# Patient Record
Sex: MALE | Race: WHITE
[De-identification: names, ages, dates, MRNs, and addresses within clinical notes are randomized per-mention and may not be internally consistent; named-entity substitution may affect disease eponyms.]

---

## 2019-10-25 ENCOUNTER — HOSPITAL ENCOUNTER (INPATIENT)
Dept: HOSPITAL 92 - ERS | Age: 69
LOS: 11 days | Discharge: ANOTHER HOSPITAL | DRG: 871 | End: 2019-11-05
Attending: INTERNAL MEDICINE | Admitting: INTERNAL MEDICINE
Payer: COMMERCIAL

## 2019-10-25 VITALS — BODY MASS INDEX: 17.9 KG/M2

## 2019-10-25 DIAGNOSIS — Z79.899: ICD-10-CM

## 2019-10-25 DIAGNOSIS — N18.9: ICD-10-CM

## 2019-10-25 DIAGNOSIS — R07.89: ICD-10-CM

## 2019-10-25 DIAGNOSIS — R65.20: ICD-10-CM

## 2019-10-25 DIAGNOSIS — A31.0: ICD-10-CM

## 2019-10-25 DIAGNOSIS — N17.9: ICD-10-CM

## 2019-10-25 DIAGNOSIS — R33.9: ICD-10-CM

## 2019-10-25 DIAGNOSIS — I95.9: ICD-10-CM

## 2019-10-25 DIAGNOSIS — K21.9: ICD-10-CM

## 2019-10-25 DIAGNOSIS — K94.29: ICD-10-CM

## 2019-10-25 DIAGNOSIS — J18.9: ICD-10-CM

## 2019-10-25 DIAGNOSIS — D64.9: ICD-10-CM

## 2019-10-25 DIAGNOSIS — E87.1: ICD-10-CM

## 2019-10-25 DIAGNOSIS — I12.9: ICD-10-CM

## 2019-10-25 DIAGNOSIS — H91.90: ICD-10-CM

## 2019-10-25 DIAGNOSIS — A41.02: Primary | ICD-10-CM

## 2019-10-25 DIAGNOSIS — J96.01: ICD-10-CM

## 2019-10-25 DIAGNOSIS — B19.20: ICD-10-CM

## 2019-10-25 DIAGNOSIS — K59.00: ICD-10-CM

## 2019-10-25 DIAGNOSIS — J44.1: ICD-10-CM

## 2019-10-25 DIAGNOSIS — N39.0: ICD-10-CM

## 2019-10-25 DIAGNOSIS — C14.0: ICD-10-CM

## 2019-10-25 DIAGNOSIS — E87.5: ICD-10-CM

## 2019-10-25 DIAGNOSIS — A04.72: ICD-10-CM

## 2019-10-25 DIAGNOSIS — Z88.0: ICD-10-CM

## 2019-10-25 DIAGNOSIS — E44.0: ICD-10-CM

## 2019-10-25 DIAGNOSIS — E87.0: ICD-10-CM

## 2019-10-25 DIAGNOSIS — C32.9: ICD-10-CM

## 2019-10-25 DIAGNOSIS — J98.11: ICD-10-CM

## 2019-10-25 LAB
ALBUMIN SERPL BCG-MCNC: 3.7 G/DL (ref 3.4–4.8)
ALP SERPL-CCNC: 89 U/L (ref 40–110)
ALT SERPL W P-5'-P-CCNC: 22 U/L (ref 8–55)
ANALYZER IN CARDIO: (no result)
ANION GAP SERPL CALC-SCNC: 13 MMOL/L (ref 10–20)
AST SERPL-CCNC: 25 U/L (ref 5–34)
BACTERIA UR QL AUTO: (no result) HPF
BASE EXCESS STD BLDA CALC-SCNC: 4.4 MEQ/L
BASOPHILS # BLD AUTO: 0 THOU/UL (ref 0–0.2)
BASOPHILS NFR BLD AUTO: 0 % (ref 0–1)
BILIRUB SERPL-MCNC: 0.3 MG/DL (ref 0.2–1.2)
BUN SERPL-MCNC: 50 MG/DL (ref 8.4–25.7)
CA-I BLDA-SCNC: 1.18 MMOL/L (ref 1.12–1.3)
CALCIUM SERPL-MCNC: 9.6 MG/DL (ref 7.8–10.44)
CHLORIDE SERPL-SCNC: 93 MMOL/L (ref 98–107)
CK MB SERPL-MCNC: 0.8 NG/ML (ref 0–6.6)
CK SERPL-CCNC: 26 U/L (ref 30–200)
CO2 SERPL-SCNC: 32 MMOL/L (ref 23–31)
CREAT CL PREDICTED SERPL C-G-VRATE: 0 ML/MIN (ref 70–130)
EOSINOPHIL # BLD AUTO: 0 THOU/UL (ref 0–0.7)
EOSINOPHIL NFR BLD AUTO: 0.1 % (ref 0–10)
GLOBULIN SER CALC-MCNC: 4 G/DL (ref 2.4–3.5)
GLUCOSE SERPL-MCNC: 107 MG/DL (ref 80–115)
HCO3 BLDA-SCNC: 28.7 MEQ/L (ref 22–28)
HCT VFR BLDA CALC: 31 % (ref 42–52)
HGB BLD-MCNC: 9.7 G/DL (ref 14–18)
HGB BLDA-MCNC: 10.5 G/DL (ref 14–18)
LEUKOCYTE ESTERASE UR QL STRIP.AUTO: 500 LEU/UL
LYMPHOCYTES # BLD: 0.4 THOU/UL (ref 1.2–3.4)
LYMPHOCYTES NFR BLD AUTO: 3.2 % (ref 21–51)
MCH RBC QN AUTO: 31.3 PG (ref 27–31)
MCV RBC AUTO: 95.1 FL (ref 78–98)
MONOCYTES # BLD AUTO: 1.4 THOU/UL (ref 0.11–0.59)
MONOCYTES NFR BLD AUTO: 9.9 % (ref 0–10)
NEUTROPHILS # BLD AUTO: 11.9 THOU/UL (ref 1.4–6.5)
NEUTROPHILS NFR BLD AUTO: 86.8 % (ref 42–75)
O2 A-A PPRESDIFF RESPIRATORY: 141.75 MM[HG] (ref 0–20)
PCO2 BLDA: 41.7 MMHG (ref 35–45)
PH BLDA: 7.46 [PH] (ref 7.35–7.45)
PLATELET # BLD AUTO: 254 THOU/UL (ref 130–400)
PO2 BLDA: 62.8 MMHG (ref 80–?)
POTASSIUM BLD-SCNC: 3.58 MMOL/L (ref 3.7–5.3)
POTASSIUM SERPL-SCNC: 3.6 MMOL/L (ref 3.5–5.1)
PROT UR STRIP.AUTO-MCNC: 30 MG/DL
RBC # BLD AUTO: 3.09 MILL/UL (ref 4.7–6.1)
RBC UR QL AUTO: (no result) HPF (ref 0–3)
SODIUM SERPL-SCNC: 134 MMOL/L (ref 136–145)
SPECIMEN DRAWN FROM PATIENT: (no result)
WBC # BLD AUTO: 13.8 THOU/UL (ref 4.8–10.8)

## 2019-10-25 PROCEDURE — 83735 ASSAY OF MAGNESIUM: CPT

## 2019-10-25 PROCEDURE — 36416 COLLJ CAPILLARY BLOOD SPEC: CPT

## 2019-10-25 PROCEDURE — 93005 ELECTROCARDIOGRAM TRACING: CPT

## 2019-10-25 PROCEDURE — 96367 TX/PROPH/DG ADDL SEQ IV INF: CPT

## 2019-10-25 PROCEDURE — 82550 ASSAY OF CK (CPK): CPT

## 2019-10-25 PROCEDURE — 90670 PCV13 VACCINE IM: CPT

## 2019-10-25 PROCEDURE — 84439 ASSAY OF FREE THYROXINE: CPT

## 2019-10-25 PROCEDURE — 36415 COLL VENOUS BLD VENIPUNCTURE: CPT

## 2019-10-25 PROCEDURE — 82805 BLOOD GASES W/O2 SATURATION: CPT

## 2019-10-25 PROCEDURE — 85025 COMPLETE CBC W/AUTO DIFF WBC: CPT

## 2019-10-25 PROCEDURE — 87086 URINE CULTURE/COLONY COUNT: CPT

## 2019-10-25 PROCEDURE — 87116 MYCOBACTERIA CULTURE: CPT

## 2019-10-25 PROCEDURE — 82553 CREATINE MB FRACTION: CPT

## 2019-10-25 PROCEDURE — 83605 ASSAY OF LACTIC ACID: CPT

## 2019-10-25 PROCEDURE — 82274 ASSAY TEST FOR BLOOD FECAL: CPT

## 2019-10-25 PROCEDURE — 80048 BASIC METABOLIC PNL TOTAL CA: CPT

## 2019-10-25 PROCEDURE — 81003 URINALYSIS AUTO W/O SCOPE: CPT

## 2019-10-25 PROCEDURE — 87077 CULTURE AEROBIC IDENTIFY: CPT

## 2019-10-25 PROCEDURE — 36569 INSJ PICC 5 YR+ W/O IMAGING: CPT

## 2019-10-25 PROCEDURE — 85027 COMPLETE CBC AUTOMATED: CPT

## 2019-10-25 PROCEDURE — 74018 RADEX ABDOMEN 1 VIEW: CPT

## 2019-10-25 PROCEDURE — C1751 CATH, INF, PER/CENT/MIDLINE: HCPCS

## 2019-10-25 PROCEDURE — 80053 COMPREHEN METABOLIC PANEL: CPT

## 2019-10-25 PROCEDURE — 90471 IMMUNIZATION ADMIN: CPT

## 2019-10-25 PROCEDURE — 82533 TOTAL CORTISOL: CPT

## 2019-10-25 PROCEDURE — 71275 CT ANGIOGRAPHY CHEST: CPT

## 2019-10-25 PROCEDURE — 96366 THER/PROPH/DIAG IV INF ADDON: CPT

## 2019-10-25 PROCEDURE — 83880 ASSAY OF NATRIURETIC PEPTIDE: CPT

## 2019-10-25 PROCEDURE — S0028 INJECTION, FAMOTIDINE, 20 MG: HCPCS

## 2019-10-25 PROCEDURE — G0009 ADMIN PNEUMOCOCCAL VACCINE: HCPCS

## 2019-10-25 PROCEDURE — 87149 DNA/RNA DIRECT PROBE: CPT

## 2019-10-25 PROCEDURE — 71045 X-RAY EXAM CHEST 1 VIEW: CPT

## 2019-10-25 PROCEDURE — 93306 TTE W/DOPPLER COMPLETE: CPT

## 2019-10-25 PROCEDURE — 93010 ELECTROCARDIOGRAM REPORT: CPT

## 2019-10-25 PROCEDURE — 84443 ASSAY THYROID STIM HORMONE: CPT

## 2019-10-25 PROCEDURE — P9047 ALBUMIN (HUMAN), 25%, 50ML: HCPCS

## 2019-10-25 PROCEDURE — 84484 ASSAY OF TROPONIN QUANT: CPT

## 2019-10-25 PROCEDURE — 81015 MICROSCOPIC EXAM OF URINE: CPT

## 2019-10-25 PROCEDURE — 94640 AIRWAY INHALATION TREATMENT: CPT

## 2019-10-25 PROCEDURE — 96365 THER/PROPH/DIAG IV INF INIT: CPT

## 2019-10-25 PROCEDURE — 87206 SMEAR FLUORESCENT/ACID STAI: CPT

## 2019-10-25 PROCEDURE — 80202 ASSAY OF VANCOMYCIN: CPT

## 2019-10-25 PROCEDURE — 96368 THER/DIAG CONCURRENT INF: CPT

## 2019-10-25 PROCEDURE — 87186 SC STD MICRODIL/AGAR DIL: CPT

## 2019-10-25 PROCEDURE — 87040 BLOOD CULTURE FOR BACTERIA: CPT

## 2019-10-25 NOTE — RAD
Exam: Chest one view



HISTORY:Hypotension. Low O2 saturation.



Comparison: None



FINDINGS:

Cardiac silhouette: Normal

Aorta: Unremarkable

Pulmonary vessels: Normal

Costophrenic angles: Clear



LUNGS: Increased linear opacities in the left and right lung base may represent bibasilar atelectasis
. The possibility of aspiration or pneumonia in the right lung base cannot be excluded.



Pneumothorax: None



Osseous abnormalities: None



IMPRESSION: Bibasilar opacities, right greater than left. Continued surveillance is recommended.



Reported By: Grace Acosta 

Electronically Signed:  10/25/2019 8:06 PM

## 2019-10-25 NOTE — CT
Exam: CT angiogram of the chest



HISTORY: Dyspnea.



COMPARISON: None





TECHNIQUE: CT angiogram of the chest is performed in the axial plane. Three-dimensional reformatted i
mages are submitted for interpretation



FINDINGS:

Mediastinum: Conglomeration of enlarged AP window lymph nodes measuring 2.6 x 1.8 cm. Enlarged right 
hilar lymph node measuring 1.7 x 1.5 cm. Enlarged subcarinal lymph node measuring 2.9 x 1.1 cm.



HEART: Normal size. No significant pericardial fluid. 

Aorta: No aneurysm or dissection 

Upper solid abdominal viscera: No abnormality enhancement. 

Trachea and central bronchi: Patent. There is opacification of multiple right lower lobe bronchioles 
and to lesser extent left lower lobe bronchioles which may be due to mucous plugging or secretions.

There is evidence of consolidation in both lower lobes likely due to infiltrate and/or atelectasis fr
om more central airway opacification.

Pleural spaces: No effusion



Lung parenchyma: There is septal thickening as well as some irregular tree-in-bud opacities. Addition
al scattered linear groundglass and nodular groundglass opacities are noted. 0.6 x 0.8 cm solid

nodule in the left lower lobe.

Pneumothorax: None

Osseous structures: No lytic or blastic lesions



Pulmonary arteries: Adequate contrast opacification pulmonary arterial system to the level of segment
al arteries. No filling defect to suggest pulmonary embolism



IMPRESSION:

1. No evidence of pulmonary artery aneurysm to segmental arteries

2. Mediastinal and right hilar lymphadenopathy.

3. Tree-in-bud opacities along with patchy linear and nodular groundglass opacities. Correlate for at
ypical infection.

4. Opacification of bilateral lower lobe bronchioles. Correlate for mucous plug versus secretions. As
sociated postobstructive atelectasis and/or bilateral lower lobe pneumonia. 

5. Solid nodule in the left lower lobe.



Code lung nodule



Transcribed Date/Time: 10/25/2019 9:29 PM



Reported By: Grace Acosta 

Electronically Signed:  10/25/2019 10:12 PM

## 2019-10-26 LAB
ANALYZER IN CARDIO: (no result)
ANION GAP SERPL CALC-SCNC: 14 MMOL/L (ref 10–20)
BASE EXCESS STD BLDA CALC-SCNC: 4.3 MEQ/L
BASOPHILS # BLD AUTO: 0 THOU/UL (ref 0–0.2)
BASOPHILS # BLD AUTO: 0.1 THOU/UL (ref 0–0.2)
BASOPHILS NFR BLD AUTO: 0 % (ref 0–1)
BASOPHILS NFR BLD AUTO: 0.4 % (ref 0–1)
BUN SERPL-MCNC: 41 MG/DL (ref 8.4–25.7)
CA-I BLDA-SCNC: 1.14 MMOL/L (ref 1.12–1.3)
CALCIUM SERPL-MCNC: 8.8 MG/DL (ref 7.8–10.44)
CHLORIDE SERPL-SCNC: 99 MMOL/L (ref 98–107)
CO2 SERPL-SCNC: 27 MMOL/L (ref 23–31)
CREAT CL PREDICTED SERPL C-G-VRATE: 42 ML/MIN (ref 70–130)
EOSINOPHIL # BLD AUTO: 0 THOU/UL (ref 0–0.7)
EOSINOPHIL # BLD AUTO: 0 THOU/UL (ref 0–0.7)
EOSINOPHIL NFR BLD AUTO: 0 % (ref 0–10)
EOSINOPHIL NFR BLD AUTO: 0.1 % (ref 0–10)
GLUCOSE SERPL-MCNC: 114 MG/DL (ref 80–115)
HCO3 BLDA-SCNC: 30.7 MEQ/L (ref 22–28)
HCT VFR BLDA CALC: 28 % (ref 42–52)
HGB BLD-MCNC: 9 G/DL (ref 14–18)
HGB BLD-MCNC: 9.5 G/DL (ref 14–18)
HGB BLDA-MCNC: 9.5 G/DL (ref 14–18)
LYMPHOCYTES # BLD: 0.2 THOU/UL (ref 1.2–3.4)
LYMPHOCYTES # BLD: 0.5 THOU/UL (ref 1.2–3.4)
LYMPHOCYTES NFR BLD AUTO: 1.1 % (ref 21–51)
LYMPHOCYTES NFR BLD AUTO: 4.1 % (ref 21–51)
MCH RBC QN AUTO: 31.7 PG (ref 27–31)
MCH RBC QN AUTO: 31.9 PG (ref 27–31)
MCV RBC AUTO: 95.8 FL (ref 78–98)
MCV RBC AUTO: 95.9 FL (ref 78–98)
MONOCYTES # BLD AUTO: 1.2 THOU/UL (ref 0.11–0.59)
MONOCYTES # BLD AUTO: 1.2 THOU/UL (ref 0.11–0.59)
MONOCYTES NFR BLD AUTO: 10.1 % (ref 0–10)
MONOCYTES NFR BLD AUTO: 8.3 % (ref 0–10)
NEUTROPHILS # BLD AUTO: 10.5 THOU/UL (ref 1.4–6.5)
NEUTROPHILS # BLD AUTO: 12.9 THOU/UL (ref 1.4–6.5)
NEUTROPHILS NFR BLD AUTO: 85.7 % (ref 42–75)
NEUTROPHILS NFR BLD AUTO: 90.2 % (ref 42–75)
O2 A-A PPRESDIFF RESPIRATORY: 219.57 MM[HG] (ref 0–20)
PCO2 BLDA: 55.7 MMHG (ref 35–45)
PH BLDA: 7.36 [PH] (ref 7.35–7.45)
PLATELET # BLD AUTO: 210 THOU/UL (ref 130–400)
PLATELET # BLD AUTO: 228 THOU/UL (ref 130–400)
PO2 BLDA: 67.3 MMHG (ref 80–?)
POTASSIUM BLD-SCNC: 3.52 MMOL/L (ref 3.7–5.3)
POTASSIUM SERPL-SCNC: 3.7 MMOL/L (ref 3.5–5.1)
RBC # BLD AUTO: 2.85 MILL/UL (ref 4.7–6.1)
RBC # BLD AUTO: 2.98 MILL/UL (ref 4.7–6.1)
SODIUM SERPL-SCNC: 136 MMOL/L (ref 136–145)
SPECIMEN DRAWN FROM PATIENT: (no result)
TROPONIN I SERPL DL<=0.01 NG/ML-MCNC: 0.02 NG/ML (ref ?–0.03)
TROPONIN I SERPL DL<=0.01 NG/ML-MCNC: 0.04 NG/ML (ref ?–0.03)
WBC # BLD AUTO: 12.3 THOU/UL (ref 4.8–10.8)
WBC # BLD AUTO: 14.3 THOU/UL (ref 4.8–10.8)

## 2019-10-26 RX ADMIN — HEPARIN SODIUM SCH UNITS: 5000 INJECTION, SOLUTION INTRAVENOUS; SUBCUTANEOUS at 08:39

## 2019-10-26 RX ADMIN — ACETAMINOPHEN AND CODEINE PHOSPHATE PRN TAB: 300; 30 TABLET ORAL at 21:03

## 2019-10-26 RX ADMIN — ACETAMINOPHEN AND CODEINE PHOSPHATE PRN TAB: 300; 30 TABLET ORAL at 14:59

## 2019-10-26 RX ADMIN — VANCOMYCIN HYDROCHLORIDE SCH MG: KIT at 12:38

## 2019-10-26 RX ADMIN — Medication SCH EACH: at 21:18

## 2019-10-26 RX ADMIN — AZITHROMYCIN SCH MLS: 500 INJECTION, POWDER, LYOPHILIZED, FOR SOLUTION INTRAVENOUS at 02:01

## 2019-10-26 RX ADMIN — AZITHROMYCIN SCH MLS: 500 INJECTION, POWDER, LYOPHILIZED, FOR SOLUTION INTRAVENOUS at 23:59

## 2019-10-26 RX ADMIN — ACETAMINOPHEN AND CODEINE PHOSPHATE PRN TAB: 300; 30 TABLET ORAL at 10:47

## 2019-10-26 RX ADMIN — VANCOMYCIN HYDROCHLORIDE SCH MG: KIT at 18:14

## 2019-10-26 RX ADMIN — HEPARIN SODIUM SCH UNITS: 5000 INJECTION, SOLUTION INTRAVENOUS; SUBCUTANEOUS at 14:48

## 2019-10-26 RX ADMIN — ACETAMINOPHEN AND CODEINE PHOSPHATE PRN TAB: 300; 30 TABLET ORAL at 05:47

## 2019-10-26 RX ADMIN — HEPARIN SODIUM SCH UNITS: 5000 INJECTION, SOLUTION INTRAVENOUS; SUBCUTANEOUS at 21:05

## 2019-10-26 NOTE — RAD
PORTABLE CHEST 1 VIEW:

 

Date:  10/26/19 

Time:  1045 hours

 

HISTORY:  

Crackles, rhonchi. 

 

FINDINGS:

 

Comparison made with exam of previous day. 

 

The heart size is normal. The aorta is tortuous. The lungs are well expanded with bibasilar infiltrat
es, right greater than left. No pneumothoraces or pleural effusions are seen. 

 

IMPRESSION: 

Stable exam. 

 

 

POS: OFF

## 2019-10-26 NOTE — HP
PRIMARY CARE PROVIDER:  Unknown.



CHIEF COMPLAINT:  Hypotension and hypoxia.



HISTORY OF PRESENT ILLNESS:  Mr. Castro is a 69-year-old gentleman, who was 
seen

in the emergency room at [facility] on October 25, 2019, 

following transfer from [facility]. 



He is unable to provide any significant history.  Collateral history was 
obtained

from discussion with emergency room physician and review of medical record. 



He is currently being treated for Clostridium difficile infection.  He was 
found to

be hypoxic and hypotensive in [facility].  Therefore, he was sent 
to the

emergency room.  His blood pressure was reportedly 84/50 at the [facility]. 



He denies any chest pain.  He denies any fevers.  He reports cough.  He reports 
that

the cough is not productive.  He denies any abdominal pain or diarrhea. 



He reports headache, but is unable to characterize it further.



REVIEW OF SYSTEMS:  All systems were reviewed and found to be negative except 
for

the pertinent positives mentioned above. 



PAST MEDICAL HISTORY:  Throat and neck cancer, gastroesophageal reflux disease,

anogenital herpes, hypertension, Clostridium difficile, hepatitis C, COPD, and

chronic kidney disease. 



PAST SURGICAL HISTORY:  Mastoidectomy.



SOCIAL HISTORY:  No history of tobacco use, alcohol use, or recreational drug 
use.



FAMILY HISTORY:  No family history of premature coronary artery disease.



ALLERGIES:  PENICILLIN.



CURRENT MEDICATIONS:  These need to be clarified, but include,

1. Acetaminophen.

2. Levothyroxine.

3. ProAir HFA.

4. DuoNebs.



PHYSICAL EXAMINATION:

GENERAL:  On examination, Mr. Castro is awake and alert, appears frail, not in

acute distress. 

VITAL SIGNS:  Blood pressure is 161/78, pulse 115, respiratory rate 20, and 
oxygen

saturation 92% on 5 L of oxygen.  T-max in the emergency room was 102.5. 

EYES:  No scleral icterus, no conjunctival pallor. 

ENT:  Dry mucosal membranes.  No oropharyngeal erythema or exudates. 

NECK:  Supple, nontender, trachea is midline. 

RESPIRATORY:  Accessory muscles of breathing are mildly active.  Chest wall

movements are symmetric bilaterally.  Lung examination reveals bibasilar 
crackles. 

CARDIOVASCULAR:  S1 and S2 are heard, tachycardic and regular.  Peripheral 
pulses

palpable.  No carotid bruit. 

ABDOMEN:  Soft, nontender, bowel sounds heard, he has a PEG tube. 

NEUROLOGIC:  Cranial nerves 2 through 12 are intact. 

MUSCULOSKELETAL:  The patient is moving all 4 extremities. 

SKIN:  No rashes. 

LYMPHATIC:  No cervical lymphadenopathy. 

PSYCHIATRIC:  Normal mood, normal affect, the patient is oriented to person and

place. 



LABORATORY DATA:  Mr. Castro's labs and investigations were reviewed.  I 
reviewed

his electrocardiogram, which shows sinus tachycardia, no ST changes to suggest 
an

acute coronary syndrome.  I also reviewed his chest x-ray, which shows bibasilar

opacity.  CT angiogram of the chest was also done.  There was no evidence of

pulmonary artery aneurysm.  He has mediastinal and right hilar lymphadenopathy,

tree-in-bud opacities along with patchy linear and nodular ground-glass 
opacities

and opacification of bilateral lower lobe bronchioles.  He has solid nodule in 
the

left lower lobe. 



He has decreased sodium of 134, elevated blood urea nitrogen of 50, elevated

creatinine of 1.74, unremarkable LFTs, decreased TSH of 0.0331, indeterminate

troponin-I of 0.045.  Leukocytosis with 13,800 white cells, of which 87% are

neutrophils; normocytic anemia with hemoglobin 9.7 and normal platelet count.

Arterial blood gases show pH 7.46, pCO2 of 41.7, and PO2 of 62.8.  Urinalysis is

positive for leukocyte esterase and hyaline casts. 



ASSESSMENT AND PLAN:  Mr. Castro is a pleasant 69-year-old gentleman, who was 
seen

at [facility] on October 25, 2019.  His problem list

includes: 

1. Bilateral pneumonia:  Mr. Castro is presenting with bilateral pneumonia.  
He is

maintaining good oxygen saturation with supplemental oxygen.  He has received

cefepime and vancomycin in the emergency room, which I will continue for now in

addition to azithromycin.  He is being admitted to [facility] because of bilateral

pneumonia. 

2. Hypotension:  The patient has responded well to fluid resuscitation with

resolution of hypotension. 

3. Chronic kidney disease:  Baseline is unclear, he appears to be chronic kidney

disease, stage 3.  We will follow creatinine and treat accordingly. 

4. Hyponatremia:  Mild, likely asymptomatic, recheck.

5. Decreased TSH:  We will check free T4.  We will adjust levothyroxine dose

accordingly. 

6. Clostridium difficile infection:  The patient is currently being treated for

Clostridium difficile.  We will resume home medications once clarified. 

7. Elevated troponin:  The patient has troponin in the indeterminate range.  
This is

most likely secondary to chronic kidney disease.  The patient denies any chest 
pain

at this time.  We will recheck troponin level. 

8. Urinary tract infection:  The patient is being started on antibiotics for

pneumonia.  We will follow urine cultures. 

9. Acute hypoxic respiratory failure:  The patient is being treated for 
pneumonia. 

Many thanks for allowing me to participate in your patient's care.  Please feel 
free

to contact me with any questions or concerns. 



LEVEL OF RISK:  High.



LEVEL OF COMPLEXITY:  High.







Job ID:  [id number]



MTDD

## 2019-10-26 NOTE — PDOC.HOSPP
- Subjective


Encounter Date: 10/26/19


Encounter Time: 10:00


Subjective: 





This morning informed patient's blood pressure in the 60's. He received 40 mg 

IV lasix overnight. Patient's urine output was 300 am, mentation reportedly 

good per nurse.  Lactate this morning noted to be elevated at 2.6.   

Maintenance IV fluids were ordered 75 cc/hour. Per nursing staff BP had dropped 

again to 60.





Patient reports having a heart ultrasound recently this year that was normal.   

His blood pressure fluctuates from 70 to 150. He states at 150 he feels 

symptomatic and starts getting headaches. Patient states that IV phenergan 

destroyed his blood vessels in his arms which have caused his fluctuating blood 

pressures and follows with cardiologist. He used to be on amlodipine but 

stopped it recently. 





He is still short of breath, has  left sided rib and chest pain and is 

requesting tylenol 3.   Says he is unable to produce phlegm to give a good 

sputum sample.   Chest X ray did not appear to show significant pulmonary edema

, therefore 500 cc bolus ordered.   BP then increased to 150-170 and stayed 

that way persistently. Saturations dropped to 84% and oxygen increased to venti-

mask.   Lasix 10 mg IV given with improvement in blood pressure to 130.  

Patient has since voided 150 








The patient states that he has had a PEG for the past 8 years due to dysphagia 

from throat cancer. He received radiation which made it hard for him to 

swallow. He has lost over 50 pounds in the past year.  He reports being 

hospitalized for pneumonia three times in the past year.  He had a bronchoscopy 

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 The  patient states that he 

received antibiotics for 8 years. He says that they re-tested his sputum to see 

if he cleared it but thinks that he still has it.  








Patient also reports having C diff and receiving vancomycin recently. No 

diarrhea currently. 








- Objective


Vital Signs & Weight: 


 Vital Signs (12 hours)











  Temp Pulse Resp BP Pulse Ox


 


 10/26/19 08:03      98


 


 10/26/19 07:18  99.2 F    


 


 10/26/19 07:05   95  24 H   93 L


 


 10/26/19 03:35  99.9 F H    


 


 10/26/19 00:40      88 L


 


 10/26/19 00:35  98.6 F  130 H   159/99 H 








 Weight











Admit Weight                   120 lb


 


Weight                         120 lb











 Most Recent Monitor Data











Heart Rate from ECG            108


 


NIBP                           134/88


 


NIBP BP-Mean                   103


 


Respiration from ECG           19


 


SpO2                           95














I&O: 


 











 10/25/19 10/26/19 10/27/19





 06:59 06:59 06:59


 


Intake Total  484 


 


Balance  484 











Result Diagrams: 


 10/26/19 08:58





 10/26/19 03:28





Hospitalist ROS





- Review of Systems


Constitutional: denies: fever, chills


Respiratory: reports: shortness of breath


Cardiovascular: reports: chest pain


Gastrointestinal: denies: nausea, vomiting





- Medication


Medications: 


Active Medications











Generic Name Dose Route Start Last Admin





  Trade Name Freq  PRN Reason Stop Dose Admin


 


Acetaminophen/Codeine Phosphate  2 tab  10/26/19 00:00  10/26/19 10:47





  Tylenol #3  PO   2 tab





  Q4H PRN   Administration





  Severe Pain (7-10)   





     





     





     


 


Albuterol/Ipratropium  3 ml  10/26/19 07:00  10/26/19 07:05





  Duoneb  NEB   3 ml





  [id number] SHAREE   Administration





     





     





     





     


 


Furosemide  10 mg  10/26/19 11:30  10/26/19 11:22





  Lasix  SLOW IVP  10/26/19 13:00  10 mg





  NOW SHAREE   Administration





     





     





     





     


 


Heparin Sodium (Porcine)  5,000 units  10/26/19 09:00  10/26/19 08:39





  Heparin  SC   5,000 units





  TID SHAREE   Administration





     





     





     





     


 


Cefepime HCl 2 gm/ Sodium  100 mls @ 200 mls/hr  10/26/19 09:00  10/26/19 08:39





  Chloride  IVPB   100 mls





  DAILY SHAREE   Administration





     





     





     





     


 


Azithromycin 500 mg/ Sodium  250 mls @ 250 mls/hr  10/25/19 23:59  10/26/19 02:

01





  Chloride  IVPB   250 mls





  2359 SHAREE   Administration





     





     





     





     


 


Sodium Chloride  1,000 mls @ 100 mls/hr  10/26/19 10:08  10/26/19 10:18





  Normal Saline 0.9%  IV   Not Given





  .Q10H SHAREE   





     





     





     





     


 


Lidocaine  1 patch  10/26/19 09:00  10/26/19 11:27





  Lidoderm 5% Patch  TD   1 patch





  DAILY SHAREE   Administration





     





     





     





     














- Exam


General Appearance: NAD, awake alert, ill appearing


General - other findings: very tachypneic, 


Eye: PERRL, anicteric sclera


ENT: normocephalic atraumatic


Neck: supple, symmetric, no JVD


Heart: no murmur, no gallops, no rubs


Heart - other findings: tachycardia 


Respiratory - other findings: diffuse rhonchi 


Gastrointestinal: soft, non-distended


Gastrointestinal - other findings: PEG tube in place 


Extremities: no cyanosis, no clubbing, no edema


Skin: normal turgor, no lesions, no rashes


Neurological: cranial nerve grossly intact, normal sensation to touch, no focal 

deficits, no new deficit





Hosp A/P





- Plan


CTA chest: septal thickenign and irregular tree in bud opacities. Scattered 

linear ground glass and nodular groundglass opacities 0.6 x 0.3 cm solid nodule 

in LLL.  Consolidation in both lower lobes due to infiltrate or atelectasis 

from more central airway opacification. Opacification of multiple RLL 

bronchioles and to lesser extent left lower lobe bronchioles which may be due 

to mucus plugging or secretions.  Enlarged right hilar lymph node 1.7 x 1.5 cm. 

Enlargeds ubcarinal lymph node 2.9 x 1.1 cm 


Chest xray 10/25/19: bibasilar opacities right greater than left





This is a 69 year old male history of pulmonary MAC infection, C diff, throat 

cancer s/p radiation with chronic PEG tube 











Severe sepsis secondary to pneumonia


- patient had temp of 99.9, tachycardic, with lactic acid 2.6. Total he has 

received 1L of fluid. CTA chest showed tree-in bud opacities consistent with 

pneuonia  


- on IV vancomycin and zosyn, WBC improving, not spiking fever, will continue 

currently 








Acute hypoxic respiratory failure secondary to pneumonia, possible heart failure


-  patient noted to have pneumonia on his CTA chest, on IV vancomycin and 

cefepime. Continue for now 


- will check ECHO


- ABG








Sinus tachycardia


- check EKG











Chest Wall pain


- lidocaine patch











History of C diff


- start oral vancomycin 








Hypotension


- hold amlodipine








Hypothyroidism


- TSH low, T4 elevated, will hold levothyroxine for now








Chronic malnutrition


- PEG tube 











DVT prophylaxis: heparin 


Code status: full

## 2019-10-26 NOTE — CON
DATE OF CONSULTATION:  10/26/2019



HISTORY OF PRESENT ILLNESS:  Laci Castro is a 69-year-old [facility] [employer] inmate.  He is 
very

hard of hearing.  Transferred from [facility] to here for hypotension. 



Apparently, he is being treated for C difficile. 



It is unclear how much diarrhea he was having at [facility].  He had blood pressure in 
80s

and down in the 70s and 60s last night.  His intake and output coming in today 
was

only positive 484 mL per the records. 



His blood pressure was improving this morning.  The hospitalist initially

contemplated transfer to the Critical Care Unit, but I evaluated him and when I 
saw

him, he was normotensive. 



PAST MEDICAL HISTORY:  Remarkable for;

1. Head and neck cancer.

2. History of reflux disease.

3. History of hypertension.

4. Hepatitis C.

5. History of COPD.

6. Chronic kidney disease.



SOCIAL HISTORY:  He is currently not a smoker or drinker.



FAMILY HISTORY:  He has no family history of lung disease.



REVIEW OF SYSTEMS: 10 point review of systems completed, otherwise negative.



ALLERGIES:  PENICILLIN ALLERGIES REPORTED.



MEDICATIONS:  He is on thyroid replacement, ProAir, and DuoNeb.



PHYSICAL EXAMINATION:

GENERAL:  He is a cachectic appearing individual.  He is very hard of hearing. 

VITAL SIGNS:  He is in sinus rhythm with a heart rate of 100 to 110, respiratory

rate is in the teens, oximetry is 92% to 95% on a face mask. 

HEENT: pupils react.

NECK:  Supple. 

LUNGS:  Remarkable for mild rhonchi bilaterally. 

HEART:  Regular rhythm. 

ABDOMEN:  Soft and nontender. 

EXTREMITIES:  Without clubbing, cyanosis, or edema.



LABORATORY AND DIAGNOSTIC DATA:  Reportedly he has a history of Mycobacterium 
avium.

 His chest CT was reviewed, showing bronchiectatic alveolar changes at his lung

bases and left upper lobe tree-in-bud abnormality that would be consistent with

Mycobacterium avium. 



I suspect he has a lot of mucus plugging in his lower lobes.



IMPRESSION:  

1. Intravascular volume depletion.  I doubt he has sepsis from his C difficile, 
he

has benign abdominal exam. 

2. Bronchiectasis, Mycobacterium avium by history provided by the hospitalist.

3. History of small AP window and hilar lymph nodes.  This can be followed up 
at the

[facility].  Since this is not related to his admission, this needs to be worked up at 
a

later date at the [facility]. 

4. Lower lobe mucus plugging.

5. C difficile colitis.



PLAN:  Continuation of treatment for his colitis needs to be ordered. 



He has one positive blood culture for Staph aureus.  The other blood culture is

negative, which would argue that this is a contaminant. 



His urine culture preliminary is negative.  Follow the other physicians caring 
for

him.  I doubt he has a component of adrenal insufficiency associated with this, 
but

I suspect he 

just has not received enough volume resuscitation.  An echocardiogram was done 
this

afternoon.  This shows a normal ejection fraction. 



With his history of head and neck cancer, he is certainly at risk for aspiration
,

but at the time of my consultation, he had no respiratory distress. 



This is a 70 minute consult, with greater than 50% of time spent on unit 
coordinating care.



Job ID:  [id number]



MTDD

## 2019-10-27 LAB
ANION GAP SERPL CALC-SCNC: 15 MMOL/L (ref 10–20)
BASOPHILS # BLD AUTO: 0 THOU/UL (ref 0–0.2)
BASOPHILS NFR BLD AUTO: 0.1 % (ref 0–1)
BUN SERPL-MCNC: 43 MG/DL (ref 8.4–25.7)
CALCIUM SERPL-MCNC: 9.2 MG/DL (ref 7.8–10.44)
CHLORIDE SERPL-SCNC: 108 MMOL/L (ref 98–107)
CO2 SERPL-SCNC: 24 MMOL/L (ref 23–31)
CREAT CL PREDICTED SERPL C-G-VRATE: 45 ML/MIN (ref 70–130)
EOSINOPHIL # BLD AUTO: 0.1 THOU/UL (ref 0–0.7)
EOSINOPHIL NFR BLD AUTO: 0.9 % (ref 0–10)
GLUCOSE SERPL-MCNC: 102 MG/DL (ref 80–115)
HGB BLD-MCNC: 9.3 G/DL (ref 14–18)
LYMPHOCYTES # BLD: 0.9 THOU/UL (ref 1.2–3.4)
LYMPHOCYTES NFR BLD AUTO: 7.4 % (ref 21–51)
MCH RBC QN AUTO: 31 PG (ref 27–31)
MCV RBC AUTO: 97.1 FL (ref 78–98)
MONOCYTES # BLD AUTO: 1.5 THOU/UL (ref 0.11–0.59)
MONOCYTES NFR BLD AUTO: 12.6 % (ref 0–10)
NEUTROPHILS # BLD AUTO: 9.7 THOU/UL (ref 1.4–6.5)
NEUTROPHILS NFR BLD AUTO: 79 % (ref 42–75)
PLATELET # BLD AUTO: 232 THOU/UL (ref 130–400)
POTASSIUM SERPL-SCNC: 3.9 MMOL/L (ref 3.5–5.1)
RBC # BLD AUTO: 3 MILL/UL (ref 4.7–6.1)
SODIUM SERPL-SCNC: 143 MMOL/L (ref 136–145)
VANCOMYCIN TROUGH SERPL-MCNC: 11.8 UG/ML
WBC # BLD AUTO: 12.2 THOU/UL (ref 4.8–10.8)

## 2019-10-27 RX ADMIN — ACETAMINOPHEN AND CODEINE PHOSPHATE PRN TAB: 300; 30 TABLET ORAL at 09:39

## 2019-10-27 RX ADMIN — VANCOMYCIN HYDROCHLORIDE SCH MG: KIT at 05:11

## 2019-10-27 RX ADMIN — HEPARIN SODIUM SCH UNITS: 5000 INJECTION, SOLUTION INTRAVENOUS; SUBCUTANEOUS at 21:41

## 2019-10-27 RX ADMIN — ACETAMINOPHEN AND CODEINE PHOSPHATE PRN TAB: 300; 30 TABLET ORAL at 14:52

## 2019-10-27 RX ADMIN — Medication SCH EACH: at 21:48

## 2019-10-27 RX ADMIN — ACETAMINOPHEN AND CODEINE PHOSPHATE PRN TAB: 300; 30 TABLET ORAL at 05:10

## 2019-10-27 RX ADMIN — VANCOMYCIN HYDROCHLORIDE SCH MG: KIT at 18:16

## 2019-10-27 RX ADMIN — FAMOTIDINE SCH MG: 10 INJECTION, SOLUTION INTRAVENOUS at 09:38

## 2019-10-27 RX ADMIN — VANCOMYCIN HYDROCHLORIDE SCH MG: KIT at 11:14

## 2019-10-27 RX ADMIN — HEPARIN SODIUM SCH UNITS: 5000 INJECTION, SOLUTION INTRAVENOUS; SUBCUTANEOUS at 14:48

## 2019-10-27 RX ADMIN — HEPARIN SODIUM SCH UNITS: 5000 INJECTION, SOLUTION INTRAVENOUS; SUBCUTANEOUS at 09:38

## 2019-10-27 RX ADMIN — VANCOMYCIN HYDROCHLORIDE SCH MG: KIT at 00:02

## 2019-10-27 RX ADMIN — VANCOMYCIN HYDROCHLORIDE SCH MLS: 1 INJECTION, SOLUTION INTRAVENOUS at 21:43

## 2019-10-27 NOTE — PDOC.HOSPP
- Subjective


Encounter Date: 10/27/19


Encounter Time: 14:00


Subjective: 





This morning, patient's oxygen requirement increased to high flow nasal 

cannula. Patient received bolus overnight for BP of 70





Patient complained of headache, states it has been the worst headache for the 

past four days. No nausea or vomiting. At time BP was 170/124. 





States that he is concerned about antibiotics, asked about probiotics.  Also 

states that he may have adrenal suppression from triamcinolone he got for 

itching of his legs. 








Also reported some loose stools this morning








- Objective


Vital Signs & Weight: 


 Vital Signs (12 hours)











  Temp Pulse Resp Pulse Ox


 


 10/27/19 19:08   101 H  26 H  97


 


 10/27/19 15:00  99.0 F   


 


 10/27/19 14:26   90  16  96


 


 10/27/19 12:00     100


 


 10/27/19 10:40  98.9 F   


 


 10/27/19 10:00     92 L


 


 10/27/19 07:49     94 L


 


 10/27/19 07:48   107 H  20  94 L


 


 10/27/19 07:41     92 L








 Weight











Admit Weight                   120 lb


 


Weight                         120 lb











 Most Recent Monitor Data











Heart Rate from ECG            102


 


NIBP                           138/82


 


NIBP BP-Mean                   100


 


Respiration from ECG           14


 


SpO2                           91














I&O: 


 











 10/26/19 10/27/19 10/28/19





 06:59 06:59 06:59


 


Intake Total 484 3486 200


 


Output Total  1400 


 


Balance 484 2086 200











Result Diagrams: 


 10/27/19 05:34





 10/27/19 05:34





Hospitalist ROS





- Review of Systems


Constitutional: denies: fever, chills





- Medication


Medications: 


Active Medications











Generic Name Dose Route Start Last Admin





  Trade Name Freq  PRN Reason Stop Dose Admin


 


Acetaminophen  1,000 mg  10/26/19 11:18  10/27/19 00:55





  Tylenol  PO   1,000 mg





  Q6H PRN   Administration





  Headache/Fever or MILD Pain   





     





     





     


 


Acetaminophen/Codeine Phosphate  1 tab  10/26/19 00:00  10/27/19 09:39





  Tylenol #3  PO   1 tab





  Q4H PRN   Administration





  Moderate Pain (4-6)   





     





     





     


 


Acetaminophen/Codeine Phosphate  2 tab  10/26/19 00:00  10/27/19 14:52





  Tylenol #3  PO   2 tab





  Q4H PRN   Administration





  Severe Pain (7-10)   





     





     





     


 


Albuterol/Ipratropium  3 ml  10/27/19 14:30  10/27/19 19:08





  Duoneb  NEB   3 ml





  [id number] SHAREE   Administration





     





     





     





     


 


Famotidine  20 mg  10/27/19 09:00  10/27/19 09:38





  Pepcid  SLOW IVP   20 mg





  DAILY SHAREE   Administration





     





     





     





     


 


Heparin Sodium (Porcine)  5,000 units  10/26/19 09:00  10/27/19 14:48





  Heparin  SC   5,000 units





  TID SHAREE   Administration





     





     





     





     


 


Cefepime HCl 2 gm/ Sodium  100 mls @ 200 mls/hr  10/26/19 09:00  10/27/19 09:38





  Chloride  IVPB   100 mls





  DAILY SHAREE   Administration





     





     





     





     


 


Sodium Chloride  1,000 mls @ 0 mls/hr  10/26/19 10:15  10/26/19 18:14





  Normal Saline 0.9%  IV   1,000 mls





  .Q0M SHAREE   Administration





     





     





     





  As Directed   


 


Sodium Chloride  1,000 mls @ 100 mls/hr  10/26/19 10:08  10/27/19 16:08





  Normal Saline 0.9%  IV   Not Given





  .Q10H SHAREE   





     





     





     





     


 


Lidocaine  1 patch  10/26/19 09:00  10/27/19 09:41





  Lidoderm 5% Patch  TD   1 patch





  DAILY SHAREE   Administration





     





     





     





     


 


Methylprednisolone Sodium Succinate  40 mg  10/27/19 12:00  10/27/19 18:16





  Solu-Medrol  IVP   40 mg





  Q6HR SHAREE   Administration





     





     





     





     


 


Miscellaneous Medication  1 each  10/26/19 21:00  10/26/19 21:18





  Lidocaine Patch Removal  TOP   1 each





  2100 SHAREE   Administration





     





     





     





     


 


Vancomycin HCl  125 mg  10/26/19 12:00  10/27/19 18:16





  First Vancomycin  PER TUBE   125 mg





  Q6HR SHAREE   Administration





     





     





     





     














- Exam


General Appearance: NAD, awake alert


Eye: PERRL, anicteric sclera


ENT: normocephalic atraumatic, no oropharyngeal lesions


Neck: supple, symmetric, no JVD, no thyromegaly


Heart: RRR, no murmur, no gallops, no rubs


Respiratory: CTAB


Respiratory - other findings: rales bilaterally 


Gastrointestinal: soft, non-tender, non-distended


Extremities: no cyanosis, no clubbing, no edema


Skin: normal turgor, no lesions, no rashes


Neurological: cranial nerve grossly intact, normal sensation to touch, no focal 

deficits, no new deficit


Musculoskeletal: normal tone, normal strength, no muscle wasting





Hosp A/P





- Plan


CTA chest: septal thickenign and irregular tree in bud opacities. Scattered 

linear ground glass and nodular groundglass opacities 0.6 x 0.3 cm solid nodule 

in LLL.  Consolidation in both lower lobes due to infiltrate or atelectasis 

from more central airway opacification. Opacification of multiple RLL 

bronchioles and to lesser extent left lower lobe bronchioles which may be due 

to mucus plugging or secretions.  Enlarged right hilar lymph node 1.7 x 1.5 cm. 

Enlargeds ubcarinal lymph node 2.9 x 1.1 cm 


Chest xray 10/25/19: bibasilar opacities right greater than left


ECHO: EF 55-60%





This is a 69 year old male history of pulmonary MAC infection, C diff, throat 

cancer s/p radiation with chronic PEG tube 











Severe sepsis secondary to pneumonia


- patient had temp of 99.9, tachycardic, with lactic acid 2.6. Total he has 

received 1L of fluid and maintenance fluids overnight. CTA chest showed tree-in 

bud opacities consistent with pneumonia  


- on IV vancomycin and cefepime,  WBC improving to 12.2


- 1/2 blood culture positive for staph, will repeat blood cultures





Acute hypoxic respiratory failure secondary to COPD exacerbation with 

bronchiectasis versus pneumonia


-  patient noted to have pneumonia on his CTA chest, on IV vancomycin and 

cefepime.  Started on IV steroids, continue for now . Increasing oxygen 

requirements, possibly from excess fluid given elevated MAP.  Hold off on 

fluids for now


- ECHO unremarkable 


- AFB normal given history of MAC infection 





Hypotension


- improved. Patient has fluctuating blood pressure at baseline. Check cortisol 

level 








Chest Wall pain


- lidocaine patch











History of C diff


- continue oral vancomycin 











Hypothyroidism


- TSH low, T4 elevated, will hold levothyroxine for now








Chronic malnutrition


- PEG tube 











DVT prophylaxis: heparin 


Code status: full

## 2019-10-27 NOTE — PRG
DATE OF SERVICE:  10/27/2019



SUBJECTIVE:  Laci Castro is more interactive today, but he also has a longer

expiratory phase. 



OBJECTIVE:  VITAL SIGNS:  He is afebrile.  Heart rate is 112, blood pressure is

174/99. 

LUNGS:  Remarkable for tight wheezes diffusely. 

HEART:  Regular rhythm. 

ABDOMEN:  Soft. 

EXTREMITIES:  Without asymmetry or edema.



DIAGNOSTIC DATA:  I repeated chest x-ray.  There are no new findings.



LABORATORY DATA:  White count 12.2, hemoglobin 9.3, platelets 232.  Sodium 143,

potassium 3.9, chloride 108, bicarb 24, BUN 43, creatinine 1.2. 



IMPRESSION:  Chronic obstructive pulmonary disease exacerbation with bronchitis. 



I have adjusted medications, added magnesium, increased frequency of his nebs.  I

have written him a note explaining everything to him.  We will continue to follow. 







Job ID:  [id number]

## 2019-10-27 NOTE — RAD
PORTABLE CHEST 1 VIEW:

 

Date:  10/27/19 

Time:  0927 hours

 

HISTORY:  

Hypoxia. 

 

FINDINGS/IMPRESSION: 

 

Comparison made with exam from previous day. 

 

The heart size is normal. The lungs are well expanded. There is an infiltrate at the right lung base,
 which is stable. Left basilar infiltrate has improved. No pneumothoraces are seen. 

 

 

POS: [facility]

## 2019-10-28 LAB
ANION GAP SERPL CALC-SCNC: 11 MMOL/L (ref 10–20)
BASOPHILS # BLD AUTO: 0 THOU/UL (ref 0–0.2)
BASOPHILS NFR BLD AUTO: 0.2 % (ref 0–1)
BUN SERPL-MCNC: 35 MG/DL (ref 8.4–25.7)
CALCIUM SERPL-MCNC: 9.7 MG/DL (ref 7.8–10.44)
CHLORIDE SERPL-SCNC: 108 MMOL/L (ref 98–107)
CO2 SERPL-SCNC: 30 MMOL/L (ref 23–31)
CREAT CL PREDICTED SERPL C-G-VRATE: 53 ML/MIN (ref 70–130)
EOSINOPHIL # BLD AUTO: 0 THOU/UL (ref 0–0.7)
EOSINOPHIL NFR BLD AUTO: 0.1 % (ref 0–10)
GLUCOSE SERPL-MCNC: 140 MG/DL (ref 80–115)
HGB BLD-MCNC: 8.4 G/DL (ref 14–18)
LYMPHOCYTES # BLD: 0.3 THOU/UL (ref 1.2–3.4)
LYMPHOCYTES NFR BLD AUTO: 4.3 % (ref 21–51)
MCH RBC QN AUTO: 30.1 PG (ref 27–31)
MCV RBC AUTO: 97.1 FL (ref 78–98)
MONOCYTES # BLD AUTO: 0.1 THOU/UL (ref 0.11–0.59)
MONOCYTES NFR BLD AUTO: 1.7 % (ref 0–10)
NEUTROPHILS # BLD AUTO: 6 THOU/UL (ref 1.4–6.5)
NEUTROPHILS NFR BLD AUTO: 93.6 % (ref 42–75)
PLATELET # BLD AUTO: 213 THOU/UL (ref 130–400)
POTASSIUM SERPL-SCNC: 3.3 MMOL/L (ref 3.5–5.1)
RBC # BLD AUTO: 2.8 MILL/UL (ref 4.7–6.1)
SODIUM SERPL-SCNC: 146 MMOL/L (ref 136–145)
WBC # BLD AUTO: 6.4 THOU/UL (ref 4.8–10.8)

## 2019-10-28 RX ADMIN — ACETAMINOPHEN AND CODEINE PHOSPHATE PRN TAB: 300; 30 TABLET ORAL at 11:20

## 2019-10-28 RX ADMIN — VANCOMYCIN HYDROCHLORIDE SCH MG: KIT at 17:38

## 2019-10-28 RX ADMIN — VANCOMYCIN HYDROCHLORIDE SCH MG: KIT at 06:26

## 2019-10-28 RX ADMIN — FAMOTIDINE SCH MG: 10 INJECTION, SOLUTION INTRAVENOUS at 08:54

## 2019-10-28 RX ADMIN — ACETAMINOPHEN AND CODEINE PHOSPHATE PRN TAB: 300; 30 TABLET ORAL at 17:57

## 2019-10-28 RX ADMIN — HEPARIN SODIUM SCH UNITS: 5000 INJECTION, SOLUTION INTRAVENOUS; SUBCUTANEOUS at 08:55

## 2019-10-28 RX ADMIN — VANCOMYCIN HYDROCHLORIDE SCH MG: KIT at 22:55

## 2019-10-28 RX ADMIN — VANCOMYCIN HYDROCHLORIDE SCH MG: KIT at 00:30

## 2019-10-28 RX ADMIN — HEPARIN SODIUM SCH UNITS: 5000 INJECTION, SOLUTION INTRAVENOUS; SUBCUTANEOUS at 16:34

## 2019-10-28 RX ADMIN — Medication SCH EACH: at 20:32

## 2019-10-28 RX ADMIN — HEPARIN SODIUM SCH: 5000 INJECTION, SOLUTION INTRAVENOUS; SUBCUTANEOUS at 20:31

## 2019-10-28 RX ADMIN — ACETAMINOPHEN AND CODEINE PHOSPHATE PRN TAB: 300; 30 TABLET ORAL at 17:38

## 2019-10-28 RX ADMIN — ACETAMINOPHEN AND CODEINE PHOSPHATE PRN TAB: 300; 30 TABLET ORAL at 06:27

## 2019-10-28 RX ADMIN — ACETAMINOPHEN AND CODEINE PHOSPHATE PRN TAB: 300; 30 TABLET ORAL at 00:29

## 2019-10-28 RX ADMIN — VANCOMYCIN HYDROCHLORIDE SCH MLS: 1 INJECTION, SOLUTION INTRAVENOUS at 20:32

## 2019-10-28 RX ADMIN — VANCOMYCIN HYDROCHLORIDE SCH MG: KIT at 11:18

## 2019-10-28 RX ADMIN — ACETAMINOPHEN AND CODEINE PHOSPHATE PRN TAB: 300; 30 TABLET ORAL at 22:55

## 2019-10-28 NOTE — PDOC.HOSPP
- Subjective


Encounter Date: 10/28/19


Encounter Time: 21:30


Subjective: 





Patient is doing better since fluids discontinued yesterday. His oxygen has 

been weaned down to 5L nasal cannula. Complained of headache today, requesting 

tylenol three.  He was noted to be sleeping comfortably. 








Placed on C diff precautions due to history of C diff








- Objective


Vital Signs & Weight: 


 Vital Signs (12 hours)











  Temp Pulse Resp Pulse Ox


 


 10/28/19 19:37  98.8 F   


 


 10/28/19 19:25     96


 


 10/28/19 19:23   103 H  16  96


 


 10/28/19 15:00  98.0 F   


 


 10/28/19 11:12   104 H  15  99








 Weight











Admit Weight                   120 lb


 


Weight                         120 lb











 Most Recent Monitor Data











Heart Rate from ECG            92


 


NIBP                           112/53


 


NIBP BP-Mean                   72


 


Respiration from ECG           13


 


SpO2                           99














I&O: 


 











 10/27/19 10/28/19 10/29/19





 06:59 06:59 06:59


 


Intake Total 3486 2461 1600


 


Output Total 1400 1080 700


 


Balance 2086 1381 900











Result Diagrams: 


 10/28/19 04:43





 10/28/19 04:43





Hospitalist ROS





- Review of Systems


Constitutional: denies: fever, chills


Respiratory: denies: cough


Cardiovascular: denies: chest pain, palpitations


Gastrointestinal: denies: nausea, vomiting, abdominal pain


Genitourinary: denies: dysuria


Musculoskeletal: denies: neck pain





- Medication


Medications: 


Active Medications











Generic Name Dose Route Start Last Admin





  Trade Name Freq  PRN Reason Stop Dose Admin


 


Acetaminophen  1,000 mg  10/26/19 11:18  10/27/19 21:41





  Tylenol  PO   1,000 mg





  Q6H PRN   Administration





  Headache/Fever or MILD Pain   





     





     





     


 


Acetaminophen/Codeine Phosphate  1 tab  10/26/19 00:00  10/28/19 11:20





  Tylenol #3  PO   1 tab





  Q4H PRN   Administration





  Moderate Pain (4-6)   





     





     





     


 


Acetaminophen/Codeine Phosphate  2 tab  10/26/19 00:00  10/28/19 17:57





  Tylenol #3  PO   2 tab





  Q4H PRN   Administration





  Severe Pain (7-10)   





     





     





     


 


Albuterol/Ipratropium  3 ml  10/27/19 14:30  10/28/19 19:23





  Duoneb  NEB   3 ml





  [id number] SHAREE   Administration





     





     





     





     


 


Famotidine  20 mg  10/27/19 09:00  10/28/19 08:54





  Pepcid  SLOW IVP   20 mg





  DAILY SHAREE   Administration





     





     





     





     


 


Guaifenesin  1,200 mg  10/27/19 21:00  10/28/19 20:31





  Mucinex  PO   1,200 mg





  Q12HR SHAREE   Administration





     





     





     





     


 


Heparin Sodium (Porcine)  5,000 units  10/26/19 09:00  10/28/19 20:31





  Heparin  SC   Not Given





  TID SHAREE   





     





     





     





     


 


Cefepime HCl 2 gm/ Sodium  100 mls @ 200 mls/hr  10/26/19 09:00  10/28/19 08:54





  Chloride  IVPB   100 mls





  DAILY SHAREE   Administration





     





     





     





     


 


Sodium Chloride  1,000 mls @ 0 mls/hr  10/26/19 10:15  10/26/19 18:14





  Normal Saline 0.9%  IV   1,000 mls





  .Q0M SHAREE   Administration





     





     





     





  As Directed   


 


Sodium Chloride  1,000 mls @ 100 mls/hr  10/26/19 10:08  10/28/19 13:17





  Normal Saline 0.9%  IV   Not Given





  .Q10H SHAREE   





     





     





     





     


 


Vancomycin HCl 1 gm/ Device  200 mls @ 200 mls/hr  10/27/19 20:00  10/28/19 20:

32





  IVPB   200 mls





  2000 SHAREE   Administration





     





     





     





     


 


Lidocaine  1 patch  10/26/19 09:00  10/28/19 08:55





  Lidoderm 5% Patch  TD   1 patch





  DAILY SHAREE   Administration





     





     





     





     


 


Methylprednisolone Sodium Succinate  40 mg  10/27/19 12:00  10/28/19 17:38





  Solu-Medrol  IVP   40 mg





  Q6HR SHAREE   Administration





     





     





     





     


 


Miscellaneous Medication  1 each  10/26/19 21:00  10/28/19 20:32





  Lidocaine Patch Removal  TOP   1 each





  2100 SHAREE   Administration





     





     





     





     


 


Vancomycin HCl  125 mg  10/26/19 12:00  10/28/19 17:38





  First Vancomycin  PER TUBE   125 mg





  Q6HR SHAREE   Administration





     





     





     





     














- Exam


General Appearance: NAD, awake alert


Eye: PERRL, anicteric sclera


ENT: normocephalic atraumatic, no oropharyngeal lesions


Neck: supple, symmetric, no JVD, no thyromegaly


Heart: RRR, no murmur, no gallops, no rubs


Respiratory: CTAB, no wheezes, no rales, no ronchi


Gastrointestinal: soft, non-tender, non-distended


Extremities: no cyanosis, no clubbing, no edema


Skin: normal turgor, no lesions, no rashes


Neurological: cranial nerve grossly intact, normal sensation to touch, no focal 

deficits, no new deficit


Musculoskeletal: normal tone, normal strength, no muscle wasting





Hosp A/P





- Plan


CTA chest: septal thickenign and irregular tree in bud opacities. Scattered 

linear ground glass and nodular groundglass opacities 0.6 x 0.3 cm solid nodule 

in LLL.  Consolidation in both lower lobes due to infiltrate or atelectasis 

from more central airway opacification. Opacification of multiple RLL 

bronchioles and to lesser extent left lower lobe bronchioles which may be due 

to mucus plugging or secretions.  Enlarged right hilar lymph node 1.7 x 1.5 cm. 

Enlargeds ubcarinal lymph node 2.9 x 1.1 cm 


Chest xray 10/25/19: bibasilar opacities right greater than left


ECHO: EF 55-60%





This is a 69 year old male history of pulmonary MAC infection, C diff, throat 

cancer s/p radiation with chronic PEG tube 











Severe sepsis secondary to pneumonia


- patient had temp of 99.9, tachycardic, with lactic acid 2.6. CTA chest showed 

tree-in bud opacities consistent with pneumonia  


- was on maintenance fluids for hypotension, but now discontinued


- on IV vancomycin and cefepime,  WBC improving to 12.2


- 1/2 blood culture positive for staph, will repeat blood cultures





Acute hypoxic respiratory failure secondary to COPD exacerbation with 

bronchiectasis versus pneumonia


-  patient noted to have pneumonia on his CTA chest, on IV vancomycin and 

cefepime day 4. 


- was on IV steroids 40 mg IV q6, will wean to 20 mg IV q6 hours


- AFB normal given history of MAC infection 








Hypernatremia


- sodium 146, fluids discontinued will continue to monitor 





Hypokalemia


- 3.3.  Replace with potassium 








Hypotension likely autonomic dysfunction


- improved. Patient has fluctuating blood pressure at baseline.  Cortisol 

normal 








Chest Wall pain


- lidocaine patch











History of C diff


- continue oral vancomycin for prophylaxis 











Hypothyroidism


- TSH low, T4 elevated, will hold levothyroxine for now








Chronic malnutrition


- PEG tube 











DVT prophylaxis: heparin 


Code status: full

## 2019-10-28 NOTE — PDOC.HOSPP
- Subjective


Subjective: 





Patients only complaint is headache, requesting tylenol 3.  Overnight, he has 

come down to 5L. 








- Objective


Vital Signs & Weight: 


 Vital Signs (12 hours)











  Temp Pulse Resp Pulse Ox


 


 10/28/19 11:12   104 H  15  99


 


 10/28/19 08:23     99


 


 10/28/19 08:14   92   99


 


 10/28/19 08:00     94 L


 


 10/28/19 07:00  98.5 F   


 


 10/28/19 04:00     96


 


 10/28/19 02:23   116 H  24 H  93 L


 


 10/28/19 00:00     93 L








 Weight











Admit Weight                   120 lb


 


Weight                         120 lb











 Most Recent Monitor Data











Heart Rate from ECG            104


 


NIBP                           152/75


 


NIBP BP-Mean                   100


 


Respiration from ECG           19


 


SpO2                           94














I&O: 


 











 10/27/19 10/28/19 10/29/19





 06:59 06:59 06:59


 


Intake Total 3486 2461 100


 


Output Total 1400 1080 


 


Balance 2086 1381 100











Result Diagrams: 


 10/28/19 04:43





 10/28/19 04:43





Hospitalist ROS





- Medication


Medications: 


Active Medications











Generic Name Dose Route Start Last Admin





  Trade Name Freq  PRN Reason Stop Dose Admin


 


Acetaminophen  1,000 mg  10/26/19 11:18  10/27/19 21:41





  Tylenol  PO   1,000 mg





  Q6H PRN   Administration





  Headache/Fever or MILD Pain   





     





     





     


 


Acetaminophen/Codeine Phosphate  1 tab  10/26/19 00:00  10/28/19 11:20





  Tylenol #3  PO   1 tab





  Q4H PRN   Administration





  Moderate Pain (4-6)   





     





     





     


 


Acetaminophen/Codeine Phosphate  2 tab  10/26/19 00:00  10/28/19 06:27





  Tylenol #3  PO   2 tab





  Q4H PRN   Administration





  Severe Pain (7-10)   





     





     





     


 


Albuterol/Ipratropium  3 ml  10/27/19 14:30  10/28/19 11:12





  Duoneb  NEB   3 ml





  [id number] SHAREE   Administration





     





     





     





     


 


Famotidine  20 mg  10/27/19 09:00  10/28/19 08:54





  Pepcid  SLOW IVP   20 mg





  DAILY SHAREE   Administration





     





     





     





     


 


Guaifenesin  1,200 mg  10/27/19 21:00  10/28/19 08:54





  Mucinex  PO   1,200 mg





  Q12HR SHAREE   Administration





     





     





     





     


 


Heparin Sodium (Porcine)  5,000 units  10/26/19 09:00  10/28/19 08:55





  Heparin  SC   5,000 units





  TID SHAREE   Administration





     





     





     





     


 


Cefepime HCl 2 gm/ Sodium  100 mls @ 200 mls/hr  10/26/19 09:00  10/28/19 08:54





  Chloride  IVPB   100 mls





  DAILY SHAREE   Administration





     





     





     





     


 


Sodium Chloride  1,000 mls @ 0 mls/hr  10/26/19 10:15  10/26/19 18:14





  Normal Saline 0.9%  IV   1,000 mls





  .Q0M SHAREE   Administration





     





     





     





  As Directed   


 


Sodium Chloride  1,000 mls @ 100 mls/hr  10/26/19 10:08  10/28/19 08:37





  Normal Saline 0.9%  IV   Not Given





  .Q10H SHAREE   





     





     





     





     


 


Vancomycin HCl 1 gm/ Device  200 mls @ 200 mls/hr  10/27/19 20:00  10/27/19 21:

43





  IVPB   200 mls





  2000 SHAREE   Administration





     





     





     





     


 


Lidocaine  1 patch  10/26/19 09:00  10/28/19 08:55





  Lidoderm 5% Patch  TD   1 patch





  DAILY SHAREE   Administration





     





     





     





     


 


Methylprednisolone Sodium Succinate  40 mg  10/27/19 12:00  10/28/19 11:19





  Solu-Medrol  IVP   40 mg





  Q6HR SHAREE   Administration





     





     





     





     


 


Miscellaneous Medication  1 each  10/26/19 21:00  10/27/19 21:48





  Lidocaine Patch Removal  TOP   1 each





  2100 SHAREE   Administration





     





     





     





     


 


Vancomycin HCl  125 mg  10/26/19 12:00  10/28/19 11:18





  First Vancomycin  PER TUBE   125 mg





  Q6HR SHAREE   Administration





     





     





     





     














- Exam


General Appearance: NAD, awake alert


Eye: PERRL, anicteric sclera


ENT: normocephalic atraumatic, no oropharyngeal lesions, dry oral mucosa


Neck: supple, symmetric, no JVD


Heart: RRR, no murmur, no gallops, no rubs


Respiratory: rales


Gastrointestinal: soft, non-tender, non-distended


Extremities: no cyanosis, no clubbing, no edema


Skin: normal turgor, no lesions, no rashes


Neurological: cranial nerve grossly intact, normal sensation to touch





Hosp A/P





- Plan


CTA chest: septal thickenign and irregular tree in bud opacities. Scattered 

linear ground glass and nodular groundglass opacities 0.6 x 0.3 cm solid nodule 

in LLL.  Consolidation in both lower lobes due to infiltrate or atelectasis 

from more central airway opacification. Opacification of multiple RLL 

bronchioles and to lesser extent left lower lobe bronchioles which may be due 

to mucus plugging or secretions.  Enlarged right hilar lymph node 1.7 x 1.5 cm. 

Enlargeds ubcarinal lymph node 2.9 x 1.1 cm 


Chest xray 10/25/19: bibasilar opacities right greater than left


ECHO: EF 55-60%





This is a 69 year old male history of pulmonary MAC infection, C diff, throat 

cancer s/p radiation with chronic PEG tube 











Severe sepsis secondary to pneumonia


- patient had temp of 99.9, tachycardic, with lactic acid 2.6. Total he has 

received 1L of fluid and maintenance fluids overnight. CTA chest showed tree-in 

bud opacities consistent with pneumonia  


- on IV vancomycin and cefepime,  WBC improving to 12.2


- 1/2 blood culture positive for staph, will repeat blood cultures





Acute hypoxic respiratory failure secondary to COPD exacerbation with 

bronchiectasis versus pneumonia


-  patient noted to have pneumonia on his CTA chest, on IV vancomycin and 

cefepime.  Started on IV steroids, continue for now . Increasing oxygen 

requirements, possibly from excess fluid given elevated MAP.  Hold off on 

fluids for now


- ECHO unremarkable 


- AFB normal given history of MAC infection 








Hypernatremia


- sodium 146





Hypokalemia


- 3.3.  Replace with potassium 








Hypotension likely autonomic dysfunction


- improved. Patient has fluctuating blood pressure at baseline.  Cortisol 

normal 








Chest Wall pain


- lidocaine patch











History of C diff


- continue oral vancomycin for prophylaxis 











Hypothyroidism


- TSH low, T4 elevated, will hold levothyroxine for now








Chronic malnutrition


- PEG tube 











DVT prophylaxis: heparin 


Code status: full

## 2019-10-28 NOTE — PRG
DATE OF SERVICE:  10/28/2019



SUBJECTIVE:  Laci Castro says he feels a little bit better today.  His exhale

time is much shorter.  He is on a cannula at 99 with a saturation at 99 now. 



OBJECTIVE:  VITAL SIGNS:  Blood pressure is 160/80, heart rate is 104, and

respiratory rate is 19. 

GENERAL:  He did talk a little bit today.  He has a hoarse voice as expected. 

LUNGS:  Remarkable for short expiratory time.  He is still wheezing. 

HEART:  Regular rhythm. 

ABDOMEN:  Soft.



LABORATORY DATA:  White count 6.4, hemoglobin 8.4, platelets 213.  Sodium 146,

potassium 3.3, chloride 108, bicarb 30, BUN 35, creatinine 1.01. 



IMPRESSION:  

1. Respiratory failure.

2. Probable swallowing dysfunction.  Aggressive workup at this point is not

indicated.  We should continue with his PEG and ice chips.  He is very careful with

the ice chips from what he told me. 

3. History of Mycobacterium avium.

4. Lower lobe bronchiectasis, likely related to chronic aspiration.

5. Mucus plugging in lower lobes on CT with no progression on chest x-ray.



PLAN:  I think all of this is COPD and lung-related and none of this is all a

cardiac issue.  I suspect he was dry from an intravascular standpoint when he was

admitted.  We will continue to follow the other doctors caring for him. 







Job ID:  [id number]

## 2019-10-29 LAB
ANION GAP SERPL CALC-SCNC: 11 MMOL/L (ref 10–20)
BUN SERPL-MCNC: 41 MG/DL (ref 8.4–25.7)
CALCIUM SERPL-MCNC: 9.4 MG/DL (ref 7.8–10.44)
CHLORIDE SERPL-SCNC: 105 MMOL/L (ref 98–107)
CO2 SERPL-SCNC: 34 MMOL/L (ref 23–31)
CREAT CL PREDICTED SERPL C-G-VRATE: 53 ML/MIN (ref 70–130)
GLUCOSE SERPL-MCNC: 190 MG/DL (ref 80–115)
HGB BLD-MCNC: 8.9 G/DL (ref 14–18)
MCH RBC QN AUTO: 31 PG (ref 27–31)
MCV RBC AUTO: 96.3 FL (ref 78–98)
PLATELET # BLD AUTO: 230 THOU/UL (ref 130–400)
POTASSIUM SERPL-SCNC: 3.1 MMOL/L (ref 3.5–5.1)
RBC # BLD AUTO: 2.87 MILL/UL (ref 4.7–6.1)
SODIUM SERPL-SCNC: 147 MMOL/L (ref 136–145)
VANCOMYCIN TROUGH SERPL-MCNC: 15.8 UG/ML
WBC # BLD AUTO: 11.7 THOU/UL (ref 4.8–10.8)

## 2019-10-29 RX ADMIN — HEPARIN SODIUM SCH: 5000 INJECTION, SOLUTION INTRAVENOUS; SUBCUTANEOUS at 20:53

## 2019-10-29 RX ADMIN — HEPARIN SODIUM SCH: 5000 INJECTION, SOLUTION INTRAVENOUS; SUBCUTANEOUS at 15:23

## 2019-10-29 RX ADMIN — VANCOMYCIN HYDROCHLORIDE SCH MG: KIT at 17:20

## 2019-10-29 RX ADMIN — ACETAMINOPHEN AND CODEINE PHOSPHATE PRN TAB: 300; 30 TABLET ORAL at 12:31

## 2019-10-29 RX ADMIN — ACETAMINOPHEN AND CODEINE PHOSPHATE PRN TAB: 300; 30 TABLET ORAL at 17:20

## 2019-10-29 RX ADMIN — HEPARIN SODIUM SCH: 5000 INJECTION, SOLUTION INTRAVENOUS; SUBCUTANEOUS at 08:00

## 2019-10-29 RX ADMIN — ACETAMINOPHEN AND CODEINE PHOSPHATE PRN TAB: 300; 30 TABLET ORAL at 03:15

## 2019-10-29 RX ADMIN — ACETAMINOPHEN AND CODEINE PHOSPHATE PRN TAB: 300; 30 TABLET ORAL at 20:52

## 2019-10-29 RX ADMIN — ACETAMINOPHEN AND CODEINE PHOSPHATE PRN TAB: 300; 30 TABLET ORAL at 08:01

## 2019-10-29 RX ADMIN — Medication SCH EACH: at 20:53

## 2019-10-29 RX ADMIN — FAMOTIDINE SCH MG: 10 INJECTION, SOLUTION INTRAVENOUS at 07:59

## 2019-10-29 RX ADMIN — VANCOMYCIN HYDROCHLORIDE SCH MG: KIT at 05:12

## 2019-10-29 RX ADMIN — VANCOMYCIN HYDROCHLORIDE SCH MLS: 1 INJECTION, SOLUTION INTRAVENOUS at 20:52

## 2019-10-29 RX ADMIN — VANCOMYCIN HYDROCHLORIDE SCH MG: KIT at 12:32

## 2019-10-29 NOTE — PDOC.HOSPP
- Subjective


Encounter Date: 10/29/19


Encounter Time: 22:00


Subjective: 








The patient states that he is constipated and wants some laxatives. IV fluids 

started due to hypernatremia.  Patient states he is starting to feel congested 

and thinks it would be good idea to stop them.   He also complains of leaking 

from his PEG tube site.  Also wants his thyroid medication resumed. 








HE also states that he feels dehydrated and wants a pitcher of ice, however 

nobody is getting it for him. 








- Objective


Vital Signs & Weight: 


 Vital Signs (12 hours)











  Temp Pulse Resp Pulse Ox


 


 10/29/19 22:17   117 H  16  91 L


 


 10/29/19 19:37  99.0 F   


 


 10/29/19 18:58   118 H  17  92 L


 


 10/29/19 16:36  98.7 F   


 


 10/29/19 14:33   111 H  21 H  95








 Weight











Admit Weight                   120 lb


 


Weight                         120 lb











 Most Recent Monitor Data











Heart Rate from ECG            105


 


NIBP                           152/84


 


NIBP BP-Mean                   106


 


Respiration from ECG           19


 


SpO2                           95














I&O: 


 











 10/28/19 10/29/19 10/30/19





 06:59 06:59 06:59


 


Intake Total 2461 3270 1400


 


Output Total 1080 1450 850


 


Balance 1381 1820 550











Result Diagrams: 


 10/29/19 12:04





 10/29/19 12:04





Hospitalist ROS





- Review of Systems


Constitutional: denies: fever, chills





- Medication


Medications: 


Active Medications











Generic Name Dose Route Start Last Admin





  Trade Name Freq  PRN Reason Stop Dose Admin


 


Acetaminophen  1,000 mg  10/26/19 11:18  10/27/19 21:41





  Tylenol  PO   1,000 mg





  Q6H PRN   Administration





  Headache/Fever or MILD Pain   





     





     





     


 


Acetaminophen/Codeine Phosphate  1 tab  10/26/19 00:00  10/29/19 20:52





  Tylenol #3  PO   1 tab





  Q4H PRN   Administration





  Moderate Pain (4-6)   





     





     





     


 


Acetaminophen/Codeine Phosphate  2 tab  10/26/19 00:00  10/28/19 17:57





  Tylenol #3  PO   2 tab





  Q4H PRN   Administration





  Severe Pain (7-10)   





     





     





     


 


Albuterol/Ipratropium  3 ml  10/27/19 14:30  10/29/19 22:17





  Duoneb  NEB   3 ml





  [id number] SHAREE   Administration





     





     





     





     


 


Famotidine  20 mg  10/27/19 09:00  10/29/19 07:59





  Pepcid  SLOW IVP   20 mg





  DAILY SHAREE   Administration





     





     





     





     


 


Guaifenesin  1,200 mg  10/27/19 21:00  10/29/19 20:52





  Mucinex  PO   1,200 mg





  Q12HR SHAREE   Administration





     





     





     





     


 


Heparin Sodium (Porcine)  5,000 units  10/26/19 09:00  10/29/19 20:53





  Heparin  SC   Not Given





  TID SHAREE   





     





     





     





     


 


Cefepime HCl 2 gm/ Sodium  100 mls @ 200 mls/hr  10/26/19 09:00  10/29/19 07:59





  Chloride  IVPB   100 mls





  DAILY SHAREE   Administration





     





     





     





     


 


Vancomycin HCl 1 gm/ Device  200 mls @ 200 mls/hr  10/27/19 20:00  10/29/19 20:

52





  IVPB   200 mls





  2000 SHAREE   Administration





     





     





     





     


 


Dextrose/Sodium Chloride  1,000 mls @ 50 mls/hr  10/29/19 13:30  10/29/19 17:20





  D5 1/2 Ns  IV   1,000 mls





  .Q20H SHAREE   Administration





     





     





     





     


 


Lidocaine  1 patch  10/26/19 09:00  10/29/19 08:01





  Lidoderm 5% Patch  TD   1 patch





  DAILY SHAREE   Administration





     





     





     





     


 


Methylprednisolone Sodium Succinate  20 mg  10/28/19 23:59  10/29/19 17:20





  Solu-Medrol  IVP   20 mg





  Q6HR SHAREE   Administration





     





     





     





     


 


Miscellaneous Medication  1 each  10/26/19 21:00  10/29/19 20:53





  Lidocaine Patch Removal  TOP   1 each





  2100 SHAREE   Administration





     





     





     





     


 


Potassium Chloride  40 meq  10/29/19 09:00  10/29/19 08:01





  K-Dur  PER TUBE   40 meq





  DAILY SHAREE   Administration





     





     





     





     


 


Vancomycin HCl  125 mg  10/26/19 12:00  10/29/19 17:20





  First Vancomycin  PER TUBE   125 mg





  Q6HR SHAREE   Administration





     





     





     





     














- Exam


General Appearance: NAD, awake alert


Eye: PERRL, anicteric sclera


ENT: normocephalic atraumatic, no oropharyngeal lesions


Neck: supple, symmetric, no JVD, no thyromegaly


Heart: RRR, no murmur, no gallops, no rubs


Respiratory: CTAB, no wheezes, no rales, no ronchi


Gastrointestinal: soft, non-tender, non-distended, normal bowel sounds


Extremities: no cyanosis, no clubbing, no edema


Skin: normal turgor, no lesions, no rashes


Neurological: cranial nerve grossly intact, normal sensation to touch, no focal 

deficits, no new deficit


Musculoskeletal: normal tone, normal strength, no muscle wasting


Psychiatric: normal affect, normal behavior, A&O x 3, oriented to person, 

oriented to place





Hosp A/P





- Plan


CTA chest: septal thickenign and irregular tree in bud opacities. Scattered 

linear ground glass and nodular groundglass opacities 0.6 x 0.3 cm solid nodule 

in LLL.  Consolidation in both lower lobes due to infiltrate or atelectasis 

from more central airway opacification. Opacification of multiple RLL 

bronchioles and to lesser extent left lower lobe bronchioles which may be due 

to mucus plugging or secretions.  Enlarged right hilar lymph node 1.7 x 1.5 cm. 

Enlargeds ubcarinal lymph node 2.9 x 1.1 cm 


Chest xray 10/25/19: bibasilar opacities right greater than left


ECHO: EF 55-60%





This is a 69 year old male history of pulmonary MAC infection, C diff, throat 

cancer s/p radiation with chronic PEG tube 











Severe sepsis secondary to pneumonia


- patient had temp of 99.9, tachycardic, with lactic acid 2.6. CTA chest showed 

tree-in bud opacities consistent with pneumonia  


- was on maintenance fluids for hypotension, but now discontinued


- on IV vancomycin and cefepime for pneumonia.  1/2 blood cultures positive for 

staph, repeat negative. 


- AFB sputum sent, preliminarily negative








Acute hypoxic respiratory failure secondary to COPD exacerbation with 

bronchiectasis versus pneumonia


-  patient noted to have pneumonia on his CTA chest, on IV vancomycin and 

cefepime day 4. 


- was on IV steroids 40 mg IV q6, will wean to 20 mg IV q6 hours


- AFB normal given history of MAC infection 








Leaking from PEG tube


- Abdominal X ray to be ordered





Constipation


- ordered miralax, senna/colace 








Hypernatremia


- sodium 146, fluids discontinued will continue to monitor 





Hypokalemia


- 3.1  Replace with potassium 








Hypotension likely autonomic dysfunction


- improved. Patient has fluctuating blood pressure at baseline.  Cortisol 

normal 








Chest Wall pain


- lidocaine patch











History of C diff


- continue oral vancomycin for prophylaxis 











Hypothyroidism


- TSH low, T4 elevated, will hold levothyroxine for now


- patient wants it resumed so will recheck TFTs in am 








Chronic malnutrition


- PEG tube 











DVT prophylaxis: heparin 


Code status: full

## 2019-10-29 NOTE — RAD
KUB



INDICATION: Leaking PEG tube



COMPARISON: None



FINDINGS:



Bowel gas: Nonspecific but without overt appearance of obstruction. There is contrast opacification w
ithin the gastric fundus. No extraluminal leakage of contrast is evident.



Lung bases: Clear.



Additional findings: No suspicious calcification demonstrated.



Osseous structures: No acute osseous abnormality is demonstrated.



IMPRESSION:

1. PEG tube catheter within the stomach. No extraluminal leakage of contrast.



Reported By: Molina Marino 

Electronically Signed:  10/29/2019 11:48 PM

## 2019-10-29 NOTE — PRG
DATE OF SERVICE:  10/29/2019



SUBJECTIVE:  Laci Castro looks a little better today. 



He says his stomach is feeling full, and he feels constipated.  He is getting pain

medications frequently. 



OBJECTIVE:  VITAL SIGNS:  He is afebrile.  Heart rate is 111, respiratory rate is

21, and oximetry is 95% on 4-1/2 L. 

LUNGS:  Respiratory rates in the teens to low 20s.  He still has end-expiratory

wheezes, but his expiratory phase is much better than two days ago, maybe a little

better than yesterday. 

HEART:  Regular rhythm. 

ABDOMEN:  Soft. 

EXTREMITIES:  Without edema.



LABORATORY DATA:  White count 11.7, hemoglobin 8.9, and platelets 230.  Sodium 147,

potassium 3.1, chloride 105, bicarb 34, BUN 41, and creatinine 1.01. 



IMPRESSION:  

1. Chronic obstructive pulmonary disease exacerbation with respiratory failure.

2. Lower lobe atelectasis and bronchiectasis.

3. Anemia.

4. Deconditioning.

5. Extremely hard of hearing.

6. Swallowing dysfunction with a PEG in place.

7. History of Mycobacterium avium.  Continue with supportive care.







Job ID:  [id number]

## 2019-10-29 NOTE — EKG
Test Reason : 

Blood Pressure : ***/*** mmHG

Vent. Rate : 099 BPM     Atrial Rate : 099 BPM

   P-R Int : 128 ms          QRS Dur : 082 ms

    QT Int : 366 ms       P-R-T Axes : 076 -42 059 degrees

   QTc Int : 469 ms

 

Sinus rhythm with Premature atrial complexes

Left axis deviation

Abnormal ECG

No previous ECGs available

Confirmed by NELIDA HILL MD ([id number]) on 10/29/2019 10:33:14 AM

 

Referred By:  JAREK           Confirmed By:NELIDA HILL MD

## 2019-10-30 LAB
ANION GAP SERPL CALC-SCNC: 13 MMOL/L (ref 10–20)
BUN SERPL-MCNC: 36 MG/DL (ref 8.4–25.7)
CALCIUM SERPL-MCNC: 9.2 MG/DL (ref 7.8–10.44)
CHLORIDE SERPL-SCNC: 99 MMOL/L (ref 98–107)
CO2 SERPL-SCNC: 36 MMOL/L (ref 23–31)
CREAT CL PREDICTED SERPL C-G-VRATE: 58 ML/MIN (ref 70–130)
GLUCOSE SERPL-MCNC: 125 MG/DL (ref 80–115)
HGB BLD-MCNC: 9.5 G/DL (ref 14–18)
MCH RBC QN AUTO: 31.6 PG (ref 27–31)
MCV RBC AUTO: 94.6 FL (ref 78–98)
PLATELET # BLD AUTO: 256 THOU/UL (ref 130–400)
POTASSIUM SERPL-SCNC: 2.9 MMOL/L (ref 3.5–5.1)
RBC # BLD AUTO: 3 MILL/UL (ref 4.7–6.1)
SODIUM SERPL-SCNC: 145 MMOL/L (ref 136–145)
T4 FREE SERPL-MCNC: 1.52 NG/DL (ref 0.7–1.48)
WBC # BLD AUTO: 10.6 THOU/UL (ref 4.8–10.8)

## 2019-10-30 RX ADMIN — HEPARIN SODIUM SCH: 5000 INJECTION, SOLUTION INTRAVENOUS; SUBCUTANEOUS at 21:27

## 2019-10-30 RX ADMIN — HEPARIN SODIUM SCH: 5000 INJECTION, SOLUTION INTRAVENOUS; SUBCUTANEOUS at 15:06

## 2019-10-30 RX ADMIN — ACETAMINOPHEN AND CODEINE PHOSPHATE PRN TAB: 300; 30 TABLET ORAL at 00:26

## 2019-10-30 RX ADMIN — HEPARIN SODIUM SCH: 5000 INJECTION, SOLUTION INTRAVENOUS; SUBCUTANEOUS at 08:44

## 2019-10-30 RX ADMIN — VANCOMYCIN HYDROCHLORIDE SCH MG: KIT at 06:25

## 2019-10-30 RX ADMIN — ACETAMINOPHEN AND CODEINE PHOSPHATE PRN TAB: 300; 30 TABLET ORAL at 08:45

## 2019-10-30 RX ADMIN — VANCOMYCIN HYDROCHLORIDE SCH MG: KIT at 12:47

## 2019-10-30 RX ADMIN — ACETAMINOPHEN AND CODEINE PHOSPHATE PRN TAB: 300; 30 TABLET ORAL at 17:29

## 2019-10-30 RX ADMIN — ACETAMINOPHEN AND CODEINE PHOSPHATE PRN TAB: 300; 30 TABLET ORAL at 04:06

## 2019-10-30 RX ADMIN — Medication SCH EACH: at 21:28

## 2019-10-30 RX ADMIN — ACETAMINOPHEN AND CODEINE PHOSPHATE PRN TAB: 300; 30 TABLET ORAL at 12:58

## 2019-10-30 RX ADMIN — FAMOTIDINE SCH MG: 10 INJECTION, SOLUTION INTRAVENOUS at 08:44

## 2019-10-30 RX ADMIN — ACETAMINOPHEN AND CODEINE PHOSPHATE PRN TAB: 300; 30 TABLET ORAL at 21:25

## 2019-10-30 RX ADMIN — VANCOMYCIN HYDROCHLORIDE SCH MG: KIT at 00:25

## 2019-10-30 RX ADMIN — VANCOMYCIN HYDROCHLORIDE SCH MLS: 1 INJECTION, SOLUTION INTRAVENOUS at 21:26

## 2019-10-30 RX ADMIN — VANCOMYCIN HYDROCHLORIDE SCH MG: KIT at 17:29

## 2019-10-30 NOTE — PDOC.HOSPP
- Subjective


Encounter Date: 10/30/19


Encounter Time: 20:00


non-verbal


Subjective: 





The patient states he feels dehydrated and was wondering why his tube feeds are 

disconnected.  States he gets total of 680 of free water a day and tube feeds 

typically at 120/hour. States he knows his body since he has been on tube feeds 

for 8 years








He No longer feels constipated and is getting 3 bowel movements daily. 





He is down to 4L nasal cannula. Breathing is better.


 





- Objective


Vital Signs & Weight: 


 Vital Signs (12 hours)











  Temp Pulse Resp BP Pulse Ox


 


 10/30/19 18:46   98  20   99


 


 10/30/19 16:00  96.9 F L  98  14  170/85 H  99


 


 10/30/19 14:07   110 H  20   99


 


 10/30/19 11:40  97.6 F  110 H  18  169/79 H  94 L


 


 10/30/19 10:56   104 H  22 H   99


 


 10/30/19 08:30   100   








 Weight











Admit Weight                   120 lb


 


Weight                         120 lb











 Most Recent Monitor Data











Heart Rate from ECG            107


 


NIBP                           158/98


 


NIBP BP-Mean                   118


 


Respiration from ECG           7


 


SpO2                           99














I&O: 


 











 10/29/19 10/30/19 10/31/19





 06:59 06:59 06:59


 


Intake Total 3270 2530 1767


 


Output Total 1450 2325 500


 


Balance 4476 503 6722











Result Diagrams: 


 10/30/19 11:18





 10/30/19 11:18





Hospitalist ROS





- Review of Systems


Constitutional: denies: fever, chills





- Medication


Medications: 


Active Medications











Generic Name Dose Route Start Last Admin





  Trade Name Freq  PRN Reason Stop Dose Admin


 


Acetaminophen  1,000 mg  10/26/19 11:18  10/27/19 21:41





  Tylenol  PO   1,000 mg





  Q6H PRN   Administration





  Headache/Fever or MILD Pain   





     





     





     


 


Acetaminophen/Codeine Phosphate  1 tab  10/26/19 00:00  10/30/19 17:29





  Tylenol #3  PO   1 tab





  Q4H PRN   Administration





  Moderate Pain (4-6)   





     





     





     


 


Acetaminophen/Codeine Phosphate  2 tab  10/26/19 00:00  10/28/19 17:57





  Tylenol #3  PO   2 tab





  Q4H PRN   Administration





  Severe Pain (7-10)   





     





     





     


 


Albuterol/Ipratropium  3 ml  10/27/19 14:30  10/30/19 18:46





  Duoneb  NEB   3 ml





  [id number] SHAREE   Administration





     





     





     





     


 


Docusate Sodium  100 mg  10/30/19 09:00  10/30/19 08:43





  Colace  PO   Not Given





  BID SHAREE   





     





     





     





     


 


Famotidine  20 mg  10/27/19 09:00  10/30/19 08:44





  Pepcid  SLOW IVP   20 mg





  DAILY SHAREE   Administration





     





     





     





     


 


Guaifenesin  1,200 mg  10/27/19 21:00  10/30/19 08:44





  Mucinex  PO   1,200 mg





  Q12HR SHAREE   Administration





     





     





     





     


 


Heparin Sodium (Porcine)  5,000 units  10/26/19 09:00  10/30/19 15:06





  Heparin  SC   Not Given





  TID SHAREE   





     





     





     





     


 


Vancomycin HCl 1 gm/ Device  200 mls @ 200 mls/hr  10/27/19 20:00  10/29/19 20:

52





  IVPB   200 mls





  2000 SHAREE   Administration





     





     





     





     


 


Lidocaine  1 patch  10/26/19 09:00  10/30/19 08:44





  Lidoderm 5% Patch  TD   1 patch





  DAILY SHAREE   Administration





     





     





     





     


 


Miscellaneous Medication  1 each  10/26/19 21:00  10/29/19 20:53





  Lidocaine Patch Removal  TOP   1 each





  2100 SHAREE   Administration





     





     





     





     


 


Polyethylene Glycol  17 gm  10/30/19 09:00  10/30/19 08:44





  Miralax  PO   Not Given





  DAILY SHAREE   





     





     





     





     


 


Potassium Chloride  40 meq  10/29/19 09:00  10/30/19 08:45





  K-Dur  PER TUBE   40 meq





  DAILY SHAREE   Administration





     





     





     





     


 


Vancomycin HCl  125 mg  10/26/19 12:00  10/30/19 17:29





  First Vancomycin  PER TUBE   125 mg





  Q6HR SHAREE   Administration





     





     





     





     














- Exam


General Appearance: NAD, awake alert


Eye: PERRL, anicteric sclera


ENT: normocephalic atraumatic, no oropharyngeal lesions, moist mucosa


Neck: supple, symmetric, no JVD, no thyromegaly


Heart: RRR, no murmur, no gallops, no rubs


Respiratory: rales


Respiratory - other findings: much clearer compared to yesterday 


Gastrointestinal: soft, non-tender, non-distended


Gastrointestinal - other findings: PEG tube in site with no leaking 


Extremities: no cyanosis, no clubbing, no edema


Skin: normal turgor, no lesions, no rashes


Neurological: cranial nerve grossly intact, normal sensation to touch, no 

weakness, no focal deficits


Musculoskeletal: normal tone, normal strength


Psychiatric: normal affect, normal behavior, A&O x 3





Hosp A/P





- Plan


CTA chest: septal thickenign and irregular tree in bud opacities. Scattered 

linear ground glass and nodular groundglass opacities 0.6 x 0.3 cm solid nodule 

in LLL.  Consolidation in both lower lobes due to infiltrate or atelectasis 

from more central airway opacification. Opacification of multiple RLL 

bronchioles and to lesser extent left lower lobe bronchioles which may be due 

to mucus plugging or secretions.  Enlarged right hilar lymph node 1.7 x 1.5 cm. 

Enlargeds ubcarinal lymph node 2.9 x 1.1 cm 


Chest xray 10/25/19: bibasilar opacities right greater than left


ECHO: EF 55-60%





This is a 69 year old male history of pulmonary MAC infection, C diff, throat 

cancer s/p radiation with chronic PEG tube 











#Severe sepsis secondary to pneumonia


#Acute hypoxic respiratory failure secondary to COPD exacerbation with 

bronchiectasis versus pneumonia


- patient had temp of 99.9, tachycardic, with lactic acid 2.6. CTA chest showed 

tree-in bud opacities consistent with pneumonia  


- was on maintenance fluids for hypotension, but now discontinued


- on IV vancomycin and cefepime for pneumonia day 5/7.  1/2 blood cultures 

positive for staph, repeat negative. 


- AFB sputum sent, preliminarily negative


- steroids weaned to 20 mg q12 hours per Dr. Castro


- down to 4L nasal cannula today 








Hypokalemia


- potassium 2.9, replaced with 80 meq potassium, will recheck BMP 








Leaking from PEG tube - resolved


- Abdominal X ray showed no extraluminal leakage 


- will resume tube feeds





Constipation


- ordered miralax, senna/colace 








Hypernatremia - resolved








Hypotension likely autonomic dysfunction


- improved. Patient has fluctuating blood pressure at baseline.  Cortisol 

normal 








Chest Wall pain


- lidocaine patch











History of C diff


- continue oral vancomycin for prophylaxis 











Hypothyroidism


- TSH low, T4 elevated, will continue to hold  levothyroxine for now





Chronic malnutrition


- PEG tube 











DVT prophylaxis: heparin 


Code status: full

## 2019-10-30 NOTE — PRG
DATE OF SERVICE:  10/30/2019



SUBJECTIVE:  Laci Castro wants to be fed with a pump.  He does not like the bolus

feeding.  He is leaking tube feeds around his PEG site with the bolus feedings. 



He says he feels full with a bolus and he preferred to have pump feedings.

Gastroenterology will be consulted today. 



OBJECTIVE:  VITAL SIGNS:  He is afebrile, heart rate is 110, respiratory rate is 18,

oximetry is 94% on 5 L, and blood pressure 169/79. 

LUNGS:  Remarkable for barely audible wheezes.  He is dramatically improved compared

to 3 to 4 days ago. 

HEART:  Regular rhythm. 

ABDOMEN:  Soft, minimally tender.



DIAGNOSTIC DATA:  Abdominal film with contrast today shows contrast in the stomach

with no extraluminal leakage. 



IMPRESSION:  

1. Chronic obstructive pulmonary disease exacerbation, slowly improving.

2. Deafness communicating with him via writing.

3. History of radiation for throat cancer, likely explaining his deafness.

4. Gastroesophageal reflux disease.

5. Hepatitis C.

6. Recent Clostridium difficile with intravascular volume depletion on presentation.

7. Chronic kidney disease.



PLAN:  Continue close followup. 



I will decrease his steroid dosing today.







Job ID:  [id number]

## 2019-10-31 LAB
ANION GAP SERPL CALC-SCNC: 14 MMOL/L (ref 10–20)
BUN SERPL-MCNC: 39 MG/DL (ref 8.4–25.7)
CALCIUM SERPL-MCNC: 8.7 MG/DL (ref 7.8–10.44)
CHLORIDE SERPL-SCNC: 94 MMOL/L (ref 98–107)
CO2 SERPL-SCNC: 35 MMOL/L (ref 23–31)
CREAT CL PREDICTED SERPL C-G-VRATE: 57 ML/MIN (ref 70–130)
GLUCOSE SERPL-MCNC: 152 MG/DL (ref 80–115)
HGB BLD-MCNC: 9.4 G/DL (ref 14–18)
MCH RBC QN AUTO: 31.6 PG (ref 27–31)
MCV RBC AUTO: 93.9 FL (ref 78–98)
PLATELET # BLD AUTO: 216 THOU/UL (ref 130–400)
POTASSIUM SERPL-SCNC: 3.6 MMOL/L (ref 3.5–5.1)
POTASSIUM SERPL-SCNC: 3.7 MMOL/L (ref 3.5–5.1)
RBC # BLD AUTO: 2.96 MILL/UL (ref 4.7–6.1)
SODIUM SERPL-SCNC: 139 MMOL/L (ref 136–145)
VANCOMYCIN TROUGH SERPL-MCNC: 18 UG/ML
WBC # BLD AUTO: 9.9 THOU/UL (ref 4.8–10.8)

## 2019-10-31 RX ADMIN — ACETAMINOPHEN AND CODEINE PHOSPHATE PRN TAB: 300; 30 TABLET ORAL at 22:07

## 2019-10-31 RX ADMIN — HEPARIN SODIUM SCH: 5000 INJECTION, SOLUTION INTRAVENOUS; SUBCUTANEOUS at 08:25

## 2019-10-31 RX ADMIN — ACETAMINOPHEN AND CODEINE PHOSPHATE PRN TAB: 300; 30 TABLET ORAL at 01:24

## 2019-10-31 RX ADMIN — HEPARIN SODIUM SCH UNITS: 5000 INJECTION, SOLUTION INTRAVENOUS; SUBCUTANEOUS at 22:08

## 2019-10-31 RX ADMIN — ACETAMINOPHEN AND CODEINE PHOSPHATE PRN TAB: 300; 30 TABLET ORAL at 06:03

## 2019-10-31 RX ADMIN — VANCOMYCIN HYDROCHLORIDE SCH MG: KIT at 01:07

## 2019-10-31 RX ADMIN — FAMOTIDINE SCH MG: 10 INJECTION, SOLUTION INTRAVENOUS at 08:18

## 2019-10-31 RX ADMIN — ACETAMINOPHEN AND CODEINE PHOSPHATE PRN TAB: 300; 30 TABLET ORAL at 10:48

## 2019-10-31 RX ADMIN — Medication SCH EACH: at 23:04

## 2019-10-31 RX ADMIN — VANCOMYCIN HYDROCHLORIDE SCH MG: KIT at 11:49

## 2019-10-31 RX ADMIN — VANCOMYCIN HYDROCHLORIDE SCH MG: KIT at 18:11

## 2019-10-31 RX ADMIN — ACETAMINOPHEN AND CODEINE PHOSPHATE PRN TAB: 300; 30 TABLET ORAL at 16:38

## 2019-10-31 RX ADMIN — HEPARIN SODIUM SCH: 5000 INJECTION, SOLUTION INTRAVENOUS; SUBCUTANEOUS at 16:39

## 2019-10-31 RX ADMIN — VANCOMYCIN HYDROCHLORIDE SCH MLS: 1 INJECTION, SOLUTION INTRAVENOUS at 22:00

## 2019-10-31 RX ADMIN — VANCOMYCIN HYDROCHLORIDE SCH MG: KIT at 06:04

## 2019-10-31 NOTE — PDOC.HOSPP
- Subjective


Encounter Date: 10/31/19


Encounter Time: 19:00


Subjective: 





THe patient is doing better. Does not feel dehydrated anymore. Breathing is 

okay.  He prefers to get down to 3L nasal cannula because he swallows better.





Patient is excited today because he felt he can swallow ice without choking and 

demonstrated for me. States this is "First time in 8 years."  Reports a method 

he uses to swallow which is alternate swallowing with coughing to get it down. 

Wants diet advanced





Asked about C diff results again and MAC results. Explained C diff not tested 

and no AFB in sputum.  Explained about MRSA in 1/2 + blood cultures








- Objective


Vital Signs & Weight: 


 Vital Signs (12 hours)











  Temp Pulse Resp BP Pulse Ox


 


 10/31/19 22:50   89  20   98


 


 10/31/19 22:03  98 F  70  19  147/72 H  99


 


 10/31/19 18:58   73  20   96


 


 10/31/19 16:51  97.8 F  96  10 L  142/67 H  95


 


 10/31/19 15:00   98  20   93 L


 


 10/31/19 12:11  97.6 F  90  18  114/65  95








 Weight











Admit Weight                   120 lb


 


Weight                         120 lb











 Most Recent Monitor Data











Heart Rate from ECG            107


 


NIBP                           158/98


 


NIBP BP-Mean                   118


 


Respiration from ECG           7


 


SpO2                           99














I&O: 


 











 10/30/19 10/31/19 11/01/19





 06:59 06:59 06:59


 


Intake Total 2530 2821 1240


 


Output Total 2325 1300 


 


Balance 205 1521 1240











Result Diagrams: 


 10/31/19 12:13





 10/31/19 12:13


Additional Labs: 


 Accuchecks











  10/31/19 10/31/19





  11:50 05:57


 


POC Glucose  184 H  145 H














Hospitalist ROS





- Review of Systems


Constitutional: reports: fever, chills





- Medication


Medications: 


Active Medications











Generic Name Dose Route Start Last Admin





  Trade Name Freq  PRN Reason Stop Dose Admin


 


Acetaminophen  1,000 mg  10/26/19 11:18  10/27/19 21:41





  Tylenol  PO   1,000 mg





  Q6H PRN   Administration





  Headache/Fever or MILD Pain   





     





     





     


 


Acetaminophen/Codeine Phosphate  1 tab  10/26/19 00:00  10/30/19 17:29





  Tylenol #3  PO   1 tab





  Q4H PRN   Administration





  Moderate Pain (4-6)   





     





     





     


 


Acetaminophen/Codeine Phosphate  2 tab  10/26/19 00:00  10/31/19 22:07





  Tylenol #3  PO   2 tab





  Q4H PRN   Administration





  Severe Pain (7-10)   





     





     





     


 


Albuterol/Ipratropium  3 ml  10/27/19 14:30  10/31/19 22:50





  Duoneb  NEB   3 ml





  K5OC-NW SHAREE   Administration





     





     





     





     


 


Docusate Sodium  100 mg  10/30/19 09:00  10/31/19 22:09





  Colace  PO   Not Given





  BID SHAREE   





     





     





     





     


 


Famotidine  20 mg  10/27/19 09:00  10/31/19 08:18





  Pepcid  SLOW IVP   20 mg





  DAILY SHAREE   Administration





     





     





     





     


 


Guaifenesin  1,200 mg  10/27/19 21:00  10/31/19 22:08





  Mucinex  PO   1,200 mg





  Q12HR SHAREE   Administration





     





     





     





     


 


Heparin Sodium (Porcine)  5,000 units  10/26/19 09:00  10/31/19 22:08





  Heparin  SC   5,000 units





  TID SHAREE   Administration





     





     





     





     


 


Vancomycin HCl 1 gm/ Device  200 mls @ 200 mls/hr  10/27/19 20:00  10/31/19 22:

00





  IVPB   200 mls





  2000 SHAREE   Administration





     





     





     





     


 


Lidocaine  1 patch  10/26/19 09:00  10/31/19 08:19





  Lidoderm 5% Patch  TD   1 patch





  DAILY SHAREE   Administration





     





     





     





     


 


Methylprednisolone Sodium Succinate  20 mg  10/30/19 21:00  10/31/19 23:04





  Solu-Medrol  IVP   20 mg





  Q12HR SHAREE   Administration





     





     





     





     


 


Miscellaneous Medication  1 each  10/26/19 21:00  10/31/19 23:04





  Lidocaine Patch Removal  TOP   1 each





  2100 SHAREE   Administration





     





     





     





     


 


Polyethylene Glycol  17 gm  10/30/19 09:00  10/31/19 08:18





  Miralax  PO   Not Given





  DAILY SHAREE   





     





     





     





     


 


Potassium Chloride  40 meq  10/29/19 09:00  10/31/19 08:16





  K-Dur  PER TUBE   40 meq





  DAILY SHAREE   Administration





     





     





     





     


 


Vancomycin HCl  125 mg  10/26/19 12:00  10/31/19 18:11





  First Vancomycin  PER TUBE   125 mg





  Q6HR SHAREE   Administration





     





     





     





     














- Exam


General Appearance: NAD, awake alert


Eye: PERRL, anicteric sclera


ENT: normocephalic atraumatic, no oropharyngeal lesions


Neck: supple, symmetric, no JVD


Heart: RRR, no murmur, no gallops


Respiratory - other findings: mild rales


Gastrointestinal: soft, non-tender, non-distended


Extremities: no cyanosis, no clubbing, no edema


Skin: normal turgor, no lesions, no rashes


Neurological: cranial nerve grossly intact, no weakness, no focal deficits


Musculoskeletal: diffuse muscle atrophy





Hosp A/P





- Plan


CTA chest: septal thickenign and irregular tree in bud opacities. Scattered 

linear ground glass and nodular groundglass opacities 0.6 x 0.3 cm solid nodule 

in LLL.  Consolidation in both lower lobes due to infiltrate or atelectasis 

from more central airway opacification. Opacification of multiple RLL 

bronchioles and to lesser extent left lower lobe bronchioles which may be due 

to mucus plugging or secretions.  Enlarged right hilar lymph node 1.7 x 1.5 cm. 

Enlargeds ubcarinal lymph node 2.9 x 1.1 cm 


Chest xray 10/25/19: bibasilar opacities right greater than left


ECHO: EF 55-60%





This is a 69 year old male history of pulmonary MAC infection, C diff, throat 

cancer s/p radiation with chronic PEG tube 











#Severe sepsis secondary to pneumonia


#Acute hypoxic respiratory failure secondary to COPD exacerbation with 

bronchiectasis versus pneumonia


# MRSA bacteremia possibly?


- patient had temp of 99.9, tachycardic, with lactic acid 2.6. CTA chest showed 

tree-in bud opacities consistent with pneumonia  


- was on maintenance fluids for hypotension, but now discontinued


- on IV vancomycin and cefepime for pneumonia day 6/7.  1/2 blood cultures 

positive for MRSA, repeat negative. Obtain ID consult with regards to empiric 

treatment for this. Cefepime discontinued


- AFB sputum sent, preliminarily negative


- steroids weaned to 20 mg q12 hours per Dr. Castro


- down to 4L nasal cannula today 








Hypokalemia


- potassium 2.9, replaced with 80 meq potassium, will recheck BMP 








Leaking from PEG tube - resolved


- Abdominal X ray showed no extraluminal leakage 


- will resume tube feeds





Constipation


- ordered miralax, senna/colace 








Hypernatremia - resolved








Hypotension likely autonomic dysfunction


- improved. Patient has fluctuating blood pressure at baseline.  Cortisol 

normal 








Chest Wall pain


- lidocaine patch











History of C diff


- continue oral vancomycin for prophylaxis 











Hypothyroidism


- TSH low, T4 elevated, will continue to hold  levothyroxine for now





Chronic malnutrition


- PEG tube 











DVT prophylaxis: heparin 


Code status: full

## 2019-10-31 NOTE — PRG
DATE OF SERVICE:  10/31/2019



SUBJECTIVE:  Mr. Castro says he feels good.  He is near his baseline.



OBJECTIVE:  VITAL SIGNS:  He is afebrile.  Heart rate is in the 90s, respiratory

rates in the teens, oximetry is 95%, blood pressure 142/67. 

LUNGS:  Clear. 

HEART:  Regular rhythm. 

ABDOMEN:  Soft.  Apparently was seen by Dr. Jason yesterday, but there is no

dictation. 



IMPRESSION:  

1. Percutaneous endoscopic gastrostomy leak externally, probably secondary to bolus

feeds.  He is back on a feeding pump, which he uses at the [facility]. 

2. Chronic obstructive pulmonary disease exacerbation with lower lobe bronchiectasis

and mucus plugging.  He is back to his baseline in my opinion.  I informed the

charge nurse that the hospitalist could be informed.  He was stable for discharge

back to the [facility].  I see no reason to keep him. 

3. [facility] [place], they can manage with day to day small events

and there is no reason to keep him in the hospital. 







Job ID:  [id number]

## 2019-11-01 PROCEDURE — B548ZZA ULTRASONOGRAPHY OF SUPERIOR VENA CAVA, GUIDANCE: ICD-10-PCS | Performed by: INTERNAL MEDICINE

## 2019-11-01 PROCEDURE — 0DH63UZ INSERTION OF FEEDING DEVICE INTO STOMACH, PERCUTANEOUS APPROACH: ICD-10-PCS | Performed by: INTERNAL MEDICINE

## 2019-11-01 PROCEDURE — 02HV33Z INSERTION OF INFUSION DEVICE INTO SUPERIOR VENA CAVA, PERCUTANEOUS APPROACH: ICD-10-PCS | Performed by: INTERNAL MEDICINE

## 2019-11-01 RX ADMIN — VANCOMYCIN HYDROCHLORIDE SCH MG: KIT at 17:09

## 2019-11-01 RX ADMIN — HEPARIN SODIUM SCH: 5000 INJECTION, SOLUTION INTRAVENOUS; SUBCUTANEOUS at 21:52

## 2019-11-01 RX ADMIN — ACETAMINOPHEN AND CODEINE PHOSPHATE PRN TAB: 300; 30 TABLET ORAL at 06:40

## 2019-11-01 RX ADMIN — HEPARIN SODIUM SCH: 5000 INJECTION, SOLUTION INTRAVENOUS; SUBCUTANEOUS at 09:51

## 2019-11-01 RX ADMIN — VANCOMYCIN HYDROCHLORIDE SCH MG: KIT at 06:42

## 2019-11-01 RX ADMIN — ACETAMINOPHEN AND CODEINE PHOSPHATE PRN TAB: 300; 30 TABLET ORAL at 15:29

## 2019-11-01 RX ADMIN — VANCOMYCIN HYDROCHLORIDE SCH MG: KIT at 23:39

## 2019-11-01 RX ADMIN — VANCOMYCIN HYDROCHLORIDE SCH MG: KIT at 12:16

## 2019-11-01 RX ADMIN — VANCOMYCIN HYDROCHLORIDE SCH MG: KIT at 00:30

## 2019-11-01 RX ADMIN — ACETAMINOPHEN AND CODEINE PHOSPHATE PRN TAB: 300; 30 TABLET ORAL at 19:29

## 2019-11-01 RX ADMIN — Medication SCH: at 23:39

## 2019-11-01 RX ADMIN — FAMOTIDINE SCH MG: 10 INJECTION, SOLUTION INTRAVENOUS at 09:50

## 2019-11-01 RX ADMIN — HEPARIN SODIUM SCH: 5000 INJECTION, SOLUTION INTRAVENOUS; SUBCUTANEOUS at 15:48

## 2019-11-01 RX ADMIN — ACETAMINOPHEN AND CODEINE PHOSPHATE PRN TAB: 300; 30 TABLET ORAL at 11:21

## 2019-11-01 RX ADMIN — VANCOMYCIN HYDROCHLORIDE SCH MLS: 1 INJECTION, SOLUTION INTRAVENOUS at 20:15

## 2019-11-01 RX ADMIN — ACETAMINOPHEN AND CODEINE PHOSPHATE PRN TAB: 300; 30 TABLET ORAL at 23:38

## 2019-11-01 RX ADMIN — ACETAMINOPHEN AND CODEINE PHOSPHATE PRN TAB: 300; 30 TABLET ORAL at 02:29

## 2019-11-01 NOTE — CON
DATE OF CONSULTATION:  11/01/2019



REASON:  Bacteremia.



HISTORY OF PRESENT ILLNESS:  A 69-year-old gentleman with history of head and neck

cancer, having had resection of his part of his mandible with a chronic gastrostomy

tube for nutritional input, severe hearing impairment, hypertension, chronic

hepatitis C with unknown history of treatment, and prior episodes of Clostridium

difficile as well as Mycobacterium avium complex infection diagnosed many years ago

with unknown active status at this time, who was brought in from the [facility] unit where

he is located because of hypotension and hypoxemia.  Initial BP was 84/50.  He did

have anterior chest pain, which was respiratory movement related.  He did have

coughing spells with some sputum.  He denied fever or headaches.  No visual

symptoms.  No back pain.  Some abdominal cramps and some diarrhea intermittently.

No genitourinary symptoms.  No hip joint, knee joint, or ankle joint pain. 



PAST MEDICAL HISTORY:  Includes head and neck cancer, status post chemoradiation

therapy and resection, part of the mandible with reconstruction; chronic G-tube for

feeding; herpes; hypertension; Clostridium difficile, treated a few months ago;

hepatitis C of unknown status; COPD; Mycobacterium avium complex infection; and

renal insufficiency. 



PAST SURGICAL HISTORY:  Mastoidectomy.



SOCIAL HISTORY:  He is a current inmate at [facility].  Prior smoking history noted.



FAMILY HISTORY:  Noncontributory.



ALLERGIES:  PENICILLIN WITH RASH.



CURRENT MEDICATIONS:  Include:

1. Vancomycin IV.

2. Potassium.

3. MiraLAX.

4. Zofran.

5. Solu-Medrol.

6. Mucinex.

7. Colace.

8. DuoNeb.

9. Tylenol No. 3.



PHYSICAL EXAMINATION:

VITAL SIGNS:  T-max 99.8, he has been afebrile since; blood pressure 115/63, pulse

87; respirations 16; and O2 saturation is 96% on 5 L nasal cannula. 

SKIN:  No areas of skin breakdown.  The patient is voiding in the urinal.  He has a

peripheral IV access. 

HEENT:  He has no lymphadenopathy.  Ocular movements are conjugate.  Sclerae white.

Conjunctivae normal.  Nasal passages patent.  Oral cavity with no native teeth

remaining in place. 

NECK:  Supple.  No jugular venous distention. 

LUNGS:  Symmetric air entry.  No obvious crackles or wheezing. 

HEART:  S1, S2 without murmurs.  No S3 or S4. 

ABDOMEN:  Soft.  Mild tenderness in various areas.  No distention.  No ascites.  No

organomegaly or bladder distention. 

GENITAL:  Normal. 

EXTREMITIES:  There is no joint inflammatory process identified.  Pulses are 1+ in

dorsalis pedis.  He is able to move extremities with limitations imposed by the

restraints placed by the [facility] personnel. 

GENERAL:  He is awake, oriented, follows commands.  He has severe hearing

impairment, and all communication was done through in writing. 



LABORATORY DATA:  His white cell count is down from 12.3 to 9.9, hemoglobin 9.4,

platelets 216, with 93% neutrophils.  Sodium 139, creatinine has improved from 1.74

down to 0.95.  Total bilirubin 0.3, AST 25, ALT 22, and alkaline phosphatase 89.

.  Albumin 3.7, globulin 4.0.  Cortisol 7.9, TSH 0.01. 



IMAGING STUDIES:  Include a CT chest angiogram, which showed a tree-in-bud

opacities, some areas of opacification with subsegmental distribution, one solid

nodule in the left lower lobe, some bronchial opacification, possibly secondary to

mucus plugging.  Mediastinal and right hilar lymphadenopathy noted.  The patient had

an echocardiogram, which was normal.  This was a transthoracic echocardiogram.

There is an abdomen x-ray done as well, which showed gastrostomy tube in place with

no leakage of luminal contrast.  Microbiology with one set of 2 sets submitted

positive for MRSA.  The organism is susceptible to clindamycin, doxycycline,

vancomycin, rifampin, tetracycline.  Vancomycin HEIDY is 1. 



ASSESSMENT:  

1. Hepatitis C with unknown status at this time.

2. Chronic obstructive pulmonary disease with Mycobacterium avium complex infection

of unknown active status at the moment. 

3. Head and neck cancer, status post treatment and in remission with sequela of

requirement for gastrostomy tube feedings. 

4. Methicillin-resistant Staphylococcus aureus infection with bacteremia of unknown

primary site. 



DISCUSSION:  Although the chest x-ray findings are abnormal, those could be related

to persistent Mycobacterium avium complex infection rather than MRSA, so we cannot

necessarily attribute the lung changes to the MRSA bacteremia.  We do not have a

firmly established source for this bacteremia.  Typically, endocarditis is

associated with continuous bacteremia, i.e., both sets would have positive.  This

decreases the likelihood of endocarditis in his case.  Because of absence of an

obvious source, he does not meet criteria for 2-week course of treatment for

methicillin-resistant Staphylococcus aureus bacteremia and will require 4 weeks

course at least, we were given intravenously with vancomycin.  I have left orders

for Case Management to set up this treatment, which could be given in the unit where

he comes from potentially.  He needs a PICC line inserted.  Regarding the hepatitis

C and the Mycobacterium avium complex, those can be followed at the unit.  Those are

chronic problems that may have been already controlled or not.  Of note, there is no

reported back pain so that markedly decreases the likelihood of a spinal infection

associated with this bacteremia.  Nonetheless, he will be at risk for development of

a late onset of the septic examination of the initial MRSA process particularly in

view of his likely immunosuppression. 







Job ID:  [id number]

## 2019-11-01 NOTE — PDOC.HOSPP
- Subjective


Encounter Date: 11/01/19


Encounter Time: 20:00


Subjective: 





The patient is doing well. His breathing is better, on 5L nasal cannula 





ID consulted for MRSA bacteremia. Patient got PICC line today, needs 4 weeks of 

IV vancomycin 





- Objective


Vital Signs & Weight: 


 Vital Signs (12 hours)











  Temp Pulse Resp BP Pulse Ox


 


 11/01/19 22:24   86  16   92 L


 


 11/01/19 19:38  97 F L  93  20  119/72  96


 


 11/01/19 18:57   84  16   92 L


 


 11/01/19 16:07   88  16  


 


 11/01/19 15:31     137/74 


 


 11/01/19 15:19  97.4 F L  88  16  101/55 L  96


 


 11/01/19 12:43  98.1 F  90  16  147/75 H  94 L


 


 11/01/19 11:28   78  16  








 Weight











Admit Weight                   120 lb


 


Weight                         4.325 oz











 Most Recent Monitor Data











Heart Rate from ECG            107


 


NIBP                           158/98


 


NIBP BP-Mean                   118


 


Respiration from ECG           7


 


SpO2                           99














I&O: 


 











 10/31/19 11/01/19 11/02/19





 06:59 06:59 06:59


 


Intake Total 2821 3670 1290


 


Output Total 1300 1200 400


 


Balance 1521 2470 890











Result Diagrams: 


 10/31/19 12:13





 10/31/19 12:13





Hospitalist ROS





- Review of Systems


Constitutional: denies: fever, chills





- Medication


Medications: 


Active Medications











Generic Name Dose Route Start Last Admin





  Trade Name Freq  PRN Reason Stop Dose Admin


 


Acetaminophen  1,000 mg  10/26/19 11:18  10/27/19 21:41





  Tylenol  PO   1,000 mg





  Q6H PRN   Administration





  Headache/Fever or MILD Pain   





     





     





     


 


Acetaminophen/Codeine Phosphate  1 tab  10/26/19 00:00  10/30/19 17:29





  Tylenol #3  PO   1 tab





  Q4H PRN   Administration





  Moderate Pain (4-6)   





     





     





     


 


Acetaminophen/Codeine Phosphate  2 tab  10/26/19 00:00  11/01/19 19:29





  Tylenol #3  PO   2 tab





  Q4H PRN   Administration





  Severe Pain (7-10)   





     





     





     


 


Albuterol/Ipratropium  3 ml  10/27/19 14:30  11/01/19 22:24





  Duoneb  NEB   3 ml





  [id number] SHAREE   Administration





     





     





     





     


 


Docusate Sodium  100 mg  10/30/19 09:00  11/01/19 21:51





  Colace  PO   Not Given





  BID SHAREE   





     





     





     





     


 


Famotidine  20 mg  10/27/19 09:00  11/01/19 09:50





  Pepcid  SLOW IVP   20 mg





  DAILY SHAREE   Administration





     





     





     





     


 


Guaifenesin  1,200 mg  10/27/19 21:00  11/01/19 21:52





  Mucinex  PO   1,200 mg





  Q12HR SHAREE   Administration





     





     





     





     


 


Heparin Sodium (Porcine)  5,000 units  10/26/19 09:00  11/01/19 21:52





  Heparin  SC   Not Given





  TID SHAREE   





     





     





     





     


 


Vancomycin HCl 1 gm/ Device  200 mls @ 200 mls/hr  10/27/19 20:00  11/01/19 20:

15





  IVPB   200 mls





  2000 SHAREE   Administration





     





     





     





     


 


Lidocaine  1 patch  10/26/19 09:00  11/01/19 09:55





  Lidoderm 5% Patch  TD   1 patch





  DAILY SHAREE   Administration





     





     





     





     


 


Methylprednisolone Sodium Succinate  20 mg  10/30/19 21:00  11/01/19 22:12





  Solu-Medrol  IVP   20 mg





  Q12HR SHAREE   Administration





     





     





     





     


 


Miscellaneous Medication  1 each  10/26/19 21:00  10/31/19 23:04





  Lidocaine Patch Removal  TOP   1 each





  2100 SHAREE   Administration





     





     





     





     


 


Polyethylene Glycol  17 gm  10/30/19 09:00  11/01/19 09:52





  Miralax  PO   17 gm





  DAILY SHAREE   Administration





     





     





     





     


 


Potassium Chloride  40 meq  10/29/19 09:00  11/01/19 09:53





  K-Dur  PER TUBE   40 meq





  DAILY SHAREE   Administration





     





     





     





     


 


Vancomycin HCl  125 mg  10/26/19 12:00  11/01/19 17:09





  First Vancomycin  PER TUBE   125 mg





  Q6HR SHAREE   Administration





     





     





     





     














- Exam


General Appearance: NAD, awake alert


Eye: PERRL, anicteric sclera


ENT: normocephalic atraumatic, no oropharyngeal lesions


Neck: supple, no JVD


Heart: RRR, no murmur, no gallops, no rubs


Respiratory - other findings: rhonchorous breath sounds mild 


Gastrointestinal: soft, non-distended


Gastrointestinal - other findings: PEG tube in place with no leakin g


Extremities: no cyanosis, no clubbing, no edema


Skin: normal turgor, no lesions, no rashes


Neurological: cranial nerve grossly intact, normal sensation to touch, no focal 

deficits, no new deficit


Musculoskeletal: normal tone, normal strength, no muscle wasting





Hosp A/P





- Plan


CTA chest: septal thickenign and irregular tree in bud opacities. Scattered 

linear ground glass and nodular groundglass opacities 0.6 x 0.3 cm solid nodule 

in LLL.  Consolidation in both lower lobes due to infiltrate or atelectasis 

from more central airway opacification. Opacification of multiple RLL 

bronchioles and to lesser extent left lower lobe bronchioles which may be due 

to mucus plugging or secretions.  Enlarged right hilar lymph node 1.7 x 1.5 cm. 

Enlargeds ubcarinal lymph node 2.9 x 1.1 cm 


Chest xray 10/25/19: bibasilar opacities right greater than left


ECHO: EF 55-60%





This is a 69 year old male history of pulmonary MAC infection, C diff, throat 

cancer s/p radiation with chronic PEG tube 











#Severe sepsis secondary to pneumonia


#Acute hypoxic respiratory failure secondary to COPD exacerbation with 

bronchiectasis versus pneumonia


# MRSA bacteremia 


# History of MAC


- patient had temp of 99.9, tachycardic, with lactic acid 2.6. CTA chest showed 

tree-in bud opacities consistent with pneumonia  


- on IV vancomycin and cefepime for pneumonia day 7/7.  Cefepime discontinued. 1

/2 blood cultures positive for MRSA, repeat negative. ID consulted and stated 

unlikely endocarditis since only one blood culture positive.  Plan to treat 

with IV vancomycin for 4 weeks with PICC line 


- AFB sputum sent, preliminarily negative. Difficult to determine MAC infection 

with preliminary results  


- steroids weaned to 20 mg q12 hours per Dr. Castro


- on 5L nasal cannula 








History of C diff


- had some loose stool. Continue oral vancomycin for prophylaxis, extend to one 

week after IV vancomycin completed





Hypokalemia- resolved








Leaking from PEG tube - resolved


- Abdominal X ray showed no extraluminal leakage 


- will resume tube feeds





Constipation


- miralax, senna/colace pr n








Hypernatremia - resolved








Hypotension likely autonomic dysfunction


- improved. Patient has fluctuating blood pressure at baseline.  Cortisol 

normal 








Chest Wall pain


- lidocaine patch








Hypothyroidism


- TSH low, T4 elevated, will continue to hold  levothyroxine for now





Chronic malnutrition


- PEG tube 














DIspo:  d/ c to shelter tomorrow 


DVT prophylaxis: heparin 


Code status: full

## 2019-11-01 NOTE — SPC
SPC CVP LINE PICC INITAL >5



History: Need for long-term IV access



Comparison: None.



Findings: Patient was brought to the specials suite. All questions were answered. Informed consent ob
tained. Timeout performed.



The patient's left arm was prepped and draped in normal sterile fashion. Using ultrasound guidance th
e left basilic vein was accessed. Over a wire and through a peel-away sheath using fluoroscopic

guidance a PICC was placed with tip at the inferior SVC. Patient tolerated the procedure well without
 complication.



Impression: Technically successful ultrasound and fluoroscopic guided PICC placement.



Fluoroscopy time: 0.6 minutes



Reported By: Eben Hope 

Electronically Signed:  11/1/2019 6:02 PM

## 2019-11-02 LAB
ANION GAP SERPL CALC-SCNC: 14 MMOL/L (ref 10–20)
BUN SERPL-MCNC: 38 MG/DL (ref 8.4–25.7)
CALCIUM SERPL-MCNC: 8.6 MG/DL (ref 7.8–10.44)
CHLORIDE SERPL-SCNC: 91 MMOL/L (ref 98–107)
CK MB SERPL-MCNC: 1.8 NG/ML (ref 0–6.6)
CK MB SERPL-MCNC: 2.1 NG/ML (ref 0–6.6)
CO2 SERPL-SCNC: 34 MMOL/L (ref 23–31)
CREAT CL PREDICTED SERPL C-G-VRATE: 0 ML/MIN (ref 70–130)
GLUCOSE SERPL-MCNC: 116 MG/DL (ref 80–115)
HGB BLD-MCNC: 9.2 G/DL (ref 14–18)
MCH RBC QN AUTO: 32.3 PG (ref 27–31)
MCV RBC AUTO: 96.3 FL (ref 78–98)
PLATELET # BLD AUTO: 263 THOU/UL (ref 130–400)
POTASSIUM SERPL-SCNC: 3.9 MMOL/L (ref 3.5–5.1)
RBC # BLD AUTO: 2.86 MILL/UL (ref 4.7–6.1)
SODIUM SERPL-SCNC: 135 MMOL/L (ref 136–145)
WBC # BLD AUTO: 14.1 THOU/UL (ref 4.8–10.8)

## 2019-11-02 RX ADMIN — ALBUMIN HUMAN SCH GM: 250 SOLUTION INTRAVENOUS at 23:46

## 2019-11-02 RX ADMIN — VANCOMYCIN HYDROCHLORIDE SCH MLS: 1 INJECTION, SOLUTION INTRAVENOUS at 20:11

## 2019-11-02 RX ADMIN — FAMOTIDINE SCH MG: 10 INJECTION, SOLUTION INTRAVENOUS at 08:08

## 2019-11-02 RX ADMIN — ACETAMINOPHEN AND CODEINE PHOSPHATE PRN TAB: 300; 30 TABLET ORAL at 23:47

## 2019-11-02 RX ADMIN — ACETAMINOPHEN AND CODEINE PHOSPHATE PRN TAB: 300; 30 TABLET ORAL at 15:32

## 2019-11-02 RX ADMIN — ALBUMIN HUMAN SCH GM: 250 SOLUTION INTRAVENOUS at 16:58

## 2019-11-02 RX ADMIN — VANCOMYCIN HYDROCHLORIDE SCH MG: KIT at 17:59

## 2019-11-02 RX ADMIN — ONDANSETRON PRN MG: 2 INJECTION INTRAMUSCULAR; INTRAVENOUS at 03:19

## 2019-11-02 RX ADMIN — HEPARIN SODIUM SCH: 5000 INJECTION, SOLUTION INTRAVENOUS; SUBCUTANEOUS at 14:54

## 2019-11-02 RX ADMIN — HEPARIN SODIUM SCH: 5000 INJECTION, SOLUTION INTRAVENOUS; SUBCUTANEOUS at 08:09

## 2019-11-02 RX ADMIN — ACETAMINOPHEN AND CODEINE PHOSPHATE PRN TAB: 300; 30 TABLET ORAL at 19:32

## 2019-11-02 RX ADMIN — ACETAMINOPHEN AND CODEINE PHOSPHATE PRN TAB: 300; 30 TABLET ORAL at 03:18

## 2019-11-02 RX ADMIN — HEPARIN SODIUM SCH: 5000 INJECTION, SOLUTION INTRAVENOUS; SUBCUTANEOUS at 20:11

## 2019-11-02 RX ADMIN — ACETAMINOPHEN AND CODEINE PHOSPHATE PRN TAB: 300; 30 TABLET ORAL at 08:17

## 2019-11-02 RX ADMIN — VANCOMYCIN HYDROCHLORIDE SCH MG: KIT at 06:11

## 2019-11-02 RX ADMIN — Medication SCH: at 20:12

## 2019-11-02 RX ADMIN — VANCOMYCIN HYDROCHLORIDE SCH MG: KIT at 12:12

## 2019-11-02 RX ADMIN — ONDANSETRON PRN MG: 2 INJECTION INTRAMUSCULAR; INTRAVENOUS at 19:34

## 2019-11-02 NOTE — PDOC.HOSPP
- Subjective


Encounter Date: 11/02/19


Encounter Time: 11:08


Subjective: 





Patient seen and examined. No new complaints. No overnight events.


c/o chest pain 9/10.


better with Tylenol.


c/o abd distension and pain.


Cant urinate this am.


No fever or chills.


No N/V. 





- Objective


Vital Signs & Weight: 


 Vital Signs (12 hours)











  Temp Pulse Resp BP Pulse Ox


 


 11/02/19 08:33  97.8 F  94  16  123/79  93 L


 


 11/02/19 07:27      95


 


 11/02/19 07:15   84  16  


 


 11/02/19 02:52  98.9 F  90  20  112/71  99


 


 11/02/19 02:33      94 L


 


 11/02/19 01:56   75  16   91 L








 Weight











Admit Weight                   120 lb


 


Weight                         4.325 oz











 Most Recent Monitor Data











Heart Rate from ECG            107


 


NIBP                           158/98


 


NIBP BP-Mean                   118


 


Respiration from ECG           7


 


SpO2                           99














I&O: 


 











 11/01/19 11/02/19 11/03/19





 06:59 06:59 05:59


 


Intake Total 3670 3340 


 


Output Total 1200 1150 


 


Balance 2470 2190 











Result Diagrams: 


 11/02/19 06:50





 11/02/19 06:50





Hospitalist ROS





- Medication


Medications: 


Active Medications











Generic Name Dose Route Start Last Admin





  Trade Name Freq  PRN Reason Stop Dose Admin


 


Acetaminophen  1,000 mg  10/26/19 11:18  10/27/19 21:41





  Tylenol  PO   1,000 mg





  Q6H PRN   Administration





  Headache/Fever or MILD Pain   





     





     





     


 


Acetaminophen/Codeine Phosphate  1 tab  10/26/19 00:00  10/30/19 17:29





  Tylenol #3  PO   1 tab





  Q4H PRN   Administration





  Moderate Pain (4-6)   





     





     





     


 


Acetaminophen/Codeine Phosphate  2 tab  10/26/19 00:00  11/02/19 08:17





  Tylenol #3  PO   2 tab





  Q4H PRN   Administration





  Severe Pain (7-10)   





     





     





     


 


Albuterol/Ipratropium  3 ml  10/27/19 14:30  11/02/19 07:15





  Duoneb  NEB   3 ml





  [id number] SHAREE   Administration





     





     





     





     


 


Docusate Sodium  100 mg  10/30/19 09:00  11/02/19 08:08





  Colace  PO   Not Given





  BID SHAREE   





     





     





     





     


 


Famotidine  20 mg  10/27/19 09:00  11/02/19 08:08





  Pepcid  SLOW IVP   20 mg





  DAILY SHAREE   Administration





     





     





     





     


 


Guaifenesin  1,200 mg  10/27/19 21:00  11/02/19 08:09





  Mucinex  PO   1,200 mg





  Q12HR SHAREE   Administration





     





     





     





     


 


Heparin Sodium (Porcine)  5,000 units  10/26/19 09:00  11/02/19 08:09





  Heparin  SC   Not Given





  TID SHAREE   





     





     





     





     


 


Vancomycin HCl 1 gm/ Device  200 mls @ 200 mls/hr  10/27/19 20:00  11/01/19 20:

15





  IVPB   200 mls





  2000 SHAREE   Administration





     





     





     





     


 


Lidocaine  1 patch  10/26/19 09:00  11/02/19 08:09





  Lidoderm 5% Patch  TD   1 patch





  DAILY SHAREE   Administration





     





     





     





     


 


Methylprednisolone Sodium Succinate  20 mg  10/30/19 21:00  11/02/19 08:09





  Solu-Medrol  IVP   20 mg





  Q12HR SHAREE   Administration





     





     





     





     


 


Miscellaneous Medication  1 each  10/26/19 21:00  11/01/19 23:39





  Lidocaine Patch Removal  TOP   Not Given





  [address] [facility]   





     





     





     





     


 


Ondansetron HCl  4 mg  10/25/19 23:45  11/02/19 03:19





  Zofran  IVP   4 mg





  Q6H PRN   Administration





  Nausea/Vomiting   





     





     





     


 


Polyethylene Glycol  17 gm  10/30/19 09:00  11/02/19 08:10





  Miralax  PO   Not Given





  DAILY [facility]   





     





     





     





     


 


Potassium Chloride  40 meq  10/29/19 09:00  11/02/19 08:10





  K-Dur  PER TUBE   40 meq





  DAILY SHAREE   Administration





     





     





     





     


 


Vancomycin HCl  125 mg  10/26/19 12:00  11/02/19 06:11





  First Vancomycin  PER TUBE   125 mg





  Q6HR SHAREE   Administration





     





     





     





     














- Exam


General Appearance: NAD


Eye: anicteric sclera


ENT: normocephalic atraumatic


Neck: supple


Heart: RRR


Respiratory: CTAB


Gastrointestinal: soft


Skin: normal turgor


Musculoskeletal: normal tone


Psychiatric: normal affect





Hosp A/P


(1) Chest pain


Code(s): R07.9 - CHEST PAIN, UNSPECIFIED   Status: Acute   





(2) Abdominal discomfort


Code(s): R10.9 - UNSPECIFIED ABDOMINAL PAIN   Status: Acute   





(3) Severe sepsis


Code(s): A41.9 - SEPSIS, UNSPECIFIED ORGANISM; R65.20 - SEVERE SEPSIS WITHOUT 

SEPTIC SHOCK   Status: Acute   





(4) Pneumonia


Code(s): J18.9 - PNEUMONIA, UNSPECIFIED ORGANISM   Status: Acute   





(5) C. difficile colitis


Code(s): A04.72 - ENTEROCOLITIS D/T CLOSTRIDIUM DIFFICILE, NOT SPCF AS RECUR   

Status: Acute   





(6) Hypothyroidism


Code(s): E03.9 - HYPOTHYROIDISM, UNSPECIFIED   Status: Chronic   





(7) Hypotension


Status: Chronic   





- Plan


old records reviewed/req, continue antibiotics, PT/OT, , DVT 

proph w/heparin, GI proph





EKG unchanged.


Trop elevated and will check 2 more sets.


Chest pain better with Tylenol.


will check bladder scan and ? goetz if needed.


AM labs


continue abx.

## 2019-11-02 NOTE — RAD
Exam: Chest one view



HISTORY:Short of breath



Comparison: 10/27/2019



FINDINGS:



Lungs: Interstitial prominence bilaterally

Cardiac silhouette:Accentuated by technique

Pulmonary vessels: Mild central prominence

Pleural Spaces: Mild blunting of the costophrenic sulci may relate to small volume bilateral pleural 
fluid.

Pneumothorax: None

There is a left-sided PICC line with tip overlying SVC.



Osseous abnormalities: None of acuity.



IMPRESSION: Findings favor mild fluid overload. Correlate clinically.

 



Reported By: Kristopher Rock 

Electronically Signed:  11/2/2019 9:42 AM

## 2019-11-02 NOTE — RAD
CHEST ONE VIEW:

11/2/19

 

HISTORY: 

Hypoxic.

 

COMPARISON: 

Radiograph of same day.

 

FINDINGS: 

PICC line tip sits at the inferior SVC/cavoatrial junction. There appears to be air space opacity thr
oughout both lower lobes, although slightly improved. 

 

IMPRESSION: 

Similar to slight interval improvement of the multifocal air space opacities. 

 

POS: HOME

## 2019-11-03 LAB
ANION GAP SERPL CALC-SCNC: 11 MMOL/L (ref 10–20)
BASOPHILS # BLD AUTO: 0.1 THOU/UL (ref 0–0.2)
BASOPHILS NFR BLD AUTO: 0.4 % (ref 0–1)
BUN SERPL-MCNC: 29 MG/DL (ref 8.4–25.7)
CALCIUM SERPL-MCNC: 9 MG/DL (ref 7.8–10.44)
CHLORIDE SERPL-SCNC: 94 MMOL/L (ref 98–107)
CO2 SERPL-SCNC: 35 MMOL/L (ref 23–31)
CREAT CL PREDICTED SERPL C-G-VRATE: 67 ML/MIN (ref 70–130)
EOSINOPHIL # BLD AUTO: 0 THOU/UL (ref 0–0.7)
EOSINOPHIL NFR BLD AUTO: 0.1 % (ref 0–10)
GLUCOSE SERPL-MCNC: 135 MG/DL (ref 80–115)
HGB BLD-MCNC: 8.4 G/DL (ref 14–18)
LYMPHOCYTES # BLD: 0.2 THOU/UL (ref 1.2–3.4)
LYMPHOCYTES NFR BLD AUTO: 1 % (ref 21–51)
MCH RBC QN AUTO: 31.2 PG (ref 27–31)
MCV RBC AUTO: 96.1 FL (ref 78–98)
MONOCYTES # BLD AUTO: 0.3 THOU/UL (ref 0.11–0.59)
MONOCYTES NFR BLD AUTO: 1.6 % (ref 0–10)
NEUTROPHILS # BLD AUTO: 18 THOU/UL (ref 1.4–6.5)
NEUTROPHILS NFR BLD AUTO: 97 % (ref 42–75)
PLATELET # BLD AUTO: 223 THOU/UL (ref 130–400)
POTASSIUM SERPL-SCNC: 4.5 MMOL/L (ref 3.5–5.1)
RBC # BLD AUTO: 2.68 MILL/UL (ref 4.7–6.1)
SODIUM SERPL-SCNC: 135 MMOL/L (ref 136–145)
WBC # BLD AUTO: 18.6 THOU/UL (ref 4.8–10.8)

## 2019-11-03 RX ADMIN — Medication SCH: at 20:24

## 2019-11-03 RX ADMIN — ALBUMIN HUMAN SCH GM: 250 SOLUTION INTRAVENOUS at 03:14

## 2019-11-03 RX ADMIN — HEPARIN SODIUM SCH UNITS: 5000 INJECTION, SOLUTION INTRAVENOUS; SUBCUTANEOUS at 19:51

## 2019-11-03 RX ADMIN — VANCOMYCIN HYDROCHLORIDE SCH MG: KIT at 17:53

## 2019-11-03 RX ADMIN — ALBUMIN HUMAN SCH GM: 250 SOLUTION INTRAVENOUS at 09:56

## 2019-11-03 RX ADMIN — ACETAMINOPHEN AND CODEINE PHOSPHATE PRN TAB: 300; 30 TABLET ORAL at 03:14

## 2019-11-03 RX ADMIN — ACETAMINOPHEN AND CODEINE PHOSPHATE PRN TAB: 300; 30 TABLET ORAL at 19:49

## 2019-11-03 RX ADMIN — VANCOMYCIN HYDROCHLORIDE SCH MG: KIT at 06:03

## 2019-11-03 RX ADMIN — ACETAMINOPHEN AND CODEINE PHOSPHATE PRN TAB: 300; 30 TABLET ORAL at 07:27

## 2019-11-03 RX ADMIN — VANCOMYCIN HYDROCHLORIDE SCH MLS: 1 INJECTION, SOLUTION INTRAVENOUS at 19:51

## 2019-11-03 RX ADMIN — ACETAMINOPHEN AND CODEINE PHOSPHATE PRN TAB: 300; 30 TABLET ORAL at 20:37

## 2019-11-03 RX ADMIN — ACETAMINOPHEN AND CODEINE PHOSPHATE PRN TAB: 300; 30 TABLET ORAL at 15:53

## 2019-11-03 RX ADMIN — VANCOMYCIN HYDROCHLORIDE SCH MG: KIT at 11:42

## 2019-11-03 RX ADMIN — ACETAMINOPHEN AND CODEINE PHOSPHATE PRN TAB: 300; 30 TABLET ORAL at 11:42

## 2019-11-03 RX ADMIN — FAMOTIDINE SCH MG: 10 INJECTION, SOLUTION INTRAVENOUS at 10:01

## 2019-11-03 RX ADMIN — HEPARIN SODIUM SCH UNITS: 5000 INJECTION, SOLUTION INTRAVENOUS; SUBCUTANEOUS at 10:01

## 2019-11-03 RX ADMIN — VANCOMYCIN HYDROCHLORIDE SCH MG: KIT at 00:37

## 2019-11-03 NOTE — PDOC.HOSPP
- Subjective


Encounter Date: 11/03/19


Encounter Time: 10:10


Subjective: 





He is feeling better today.


Chest pain is better.


No sob.





- Objective


Vital Signs & Weight: 


 Vital Signs (12 hours)











  Temp Pulse Resp BP Pulse Ox


 


 11/03/19 07:30   87  16   95


 


 11/03/19 07:14  97.0 F L  94  12  122/96 H  96


 


 11/03/19 03:45  98.6 F  88  20  98/58 L  96


 


 11/03/19 02:04      92 L


 


 11/03/19 01:42 CST   72  16   95


 


 11/03/19 00:05  98 F  90  20  136/75  98








 Weight











Admit Weight                   120 lb


 


Weight                         122 lb











 Most Recent Monitor Data











Heart Rate from ECG            107


 


NIBP                           158/98


 


NIBP BP-Mean                   118


 


Respiration from ECG           7


 


SpO2                           99














I&O: 


 











 11/02/19 11/03/19 11/04/19





 07:59 06:59 06:59


 


Intake Total   100


 


Output Total   


 


Balance   100











Result Diagrams: 


 11/03/19 01:31 CST





 11/03/19 01:31 CST


Additional Labs: 


 Accuchecks











  11/02/19





  12:06


 


POC Glucose  105














Hospitalist ROS





- Medication


Medications: 


Active Medications











Generic Name Dose Route Start Last Admin





  Trade Name Freq  PRN Reason Stop Dose Admin


 


Acetaminophen/Codeine Phosphate  1 tab  10/26/19 00:00  10/30/19 17:29





  Tylenol #3  PO   1 tab





  Q4H PRN   Administration





  Moderate Pain (4-6)   





     





     





     


 


Acetaminophen/Codeine Phosphate  2 tab  10/26/19 00:00  11/03/19 07:27





  Tylenol #3  PO   2 tab





  Q4H PRN   Administration





  Severe Pain (7-10)   





     





     





     


 


Albumin Human  25 gm  11/02/19 16:45  11/03/19 09:56





  Albumin 25%  IVPB  11/03/19 10:46  25 gm





  Q6H SHAREE   Administration





     





     





     





     


 


Albuterol/Ipratropium  3 ml  10/27/19 14:30  11/03/19 07:30





  Duoneb  NEB   3 ml





  [id number] SHAREE   Administration





     





     





     





     


 


Docusate Sodium  100 mg  10/30/19 09:00  11/03/19 09:57





  Colace  PO   Not Given





  BID SHAREE   





     





     





     





     


 


Famotidine  20 mg  10/27/19 09:00  11/03/19 10:01





  Pepcid  SLOW IVP   20 mg





  DAILY SHAREE   Administration





     





     





     





     


 


Guaifenesin  1,200 mg  10/27/19 21:00  11/03/19 09:53





  Mucinex  PO   1,200 mg





  Q12HR SHAREE   Administration





     





     





     





     


 


Vancomycin HCl 1 gm/ Device  200 mls @ 200 mls/hr  10/27/19 20:00  11/02/19 20:

11





  IVPB   200 mls





  2000 SHAREE   Administration





     





     





     





     


 


Lidocaine  1 patch  10/26/19 09:00  11/03/19 09:53





  Lidoderm 5% Patch  TD   1 patch





  DAILY SHAREE   Administration





     





     





     





     


 


Methylprednisolone Sodium Succinate  20 mg  10/30/19 21:00  11/03/19 09:55





  Solu-Medrol  IVP   20 mg





  Q12HR SHAREE   Administration





     





     





     





     


 


Midodrine  5 mg  11/02/19 21:00  11/03/19 09:55





  Proamatine  PO   5 mg





  TID SHAREE   Administration





     





     





     





     


 


Miscellaneous Medication  1 each  10/26/19 21:00  11/02/19 20:12





  Lidocaine Patch Removal  TOP   Not Given





  [address] [facility]   





     





     





     





     


 


Neomycin/Polymyxin/Bacitracin  1 gm  11/03/19 00:14  11/03/19 00:37





  Triple Antibiotic  TOP   1 gm





  Q4H PRN   Administration





  REDNESS AND IRRITATION   





     





     





     


 


Ondansetron HCl  4 mg  10/25/19 23:45  11/02/19 19:34





  Zofran  IVP   4 mg





  Q6H PRN   Administration





  Nausea/Vomiting   





     





     





     


 


Polyethylene Glycol  17 gm  10/30/19 09:00  11/02/19 08:10





  Miralax  PO   Not Given





  DAILY SHAREE   





     





     





     





     


 


Potassium Chloride  40 meq  10/29/19 09:00  11/03/19 09:55





  K-Dur  PER TUBE   40 meq





  DAILY SHAREE   Administration





     





     





     





     


 


Tamsulosin HCl  0.4 mg  11/02/19 21:00  11/02/19 19:32





  Flomax  PO   0.4 mg





  HS SHAREE   Administration





     





     





     





     


 


Vancomycin HCl  125 mg  10/26/19 12:00  11/03/19 06:03





  First Vancomycin  PER TUBE   125 mg





  Q6HR SHAREE   Administration





     





     





     





     














- Exam


General Appearance: NAD


Eye: PERRL


ENT: normocephalic atraumatic


Neck: supple


Heart: RRR


Respiratory: CTAB


Gastrointestinal: soft


Skin: normal turgor


Neurological: no weakness


Musculoskeletal: normal tone


Psychiatric: normal affect





Hosp A/P


(1) Chest pain


Code(s): R07.9 - CHEST PAIN, UNSPECIFIED   Status: Acute   





(2) Abdominal discomfort


Code(s): R10.9 - UNSPECIFIED ABDOMINAL PAIN   Status: Acute   





(3) Severe sepsis


Code(s): A41.9 - SEPSIS, UNSPECIFIED ORGANISM; R65.20 - SEVERE SEPSIS WITHOUT 

SEPTIC SHOCK   Status: Acute   





(4) Pneumonia


Code(s): J18.9 - PNEUMONIA, UNSPECIFIED ORGANISM   Status: Acute   





(5) C. difficile colitis


Code(s): A04.72 - ENTEROCOLITIS D/T CLOSTRIDIUM DIFFICILE, NOT SPCF AS RECUR   

Status: Acute   





(6) Hypothyroidism


Code(s): E03.9 - HYPOTHYROIDISM, UNSPECIFIED   Status: Chronic   





(7) Hypotension


Status: Chronic   





- Plan


old records reviewed/req, continue antibiotics





continue IV vanc for pneumonia


continue oral vanc for cdiff


continue to hold levothyroxine and recheck TSH in am.


Leukocytosis ?sec to steroids. No fever.


chest pain -mostly pleuritic


continue pain control.


non specific ST-T changes - cardiology consulted.


AM labs.


possible DC back to [facility] in AM.

## 2019-11-03 NOTE — CON
DATE OF CONSULTATION:  



HISTORY OF PRESENT ILLNESS:  The patient is a 69-year-old gentleman with 
multiple medical problems, who was admitted with sepsis and developed chest 
discomfort.  The patient has a history of Mycobacterium avium complex.  He also 
has a history of head and neck and throat carcinoma.  He has had placement of a 
G-tube.  The patient

presents for evaluation of chest discomfort.  Reports whenever he takes a deep 
breath, he feels worsening chest discomfort throughout his chest.  It is clearly

made worse with a deep breath or movement.  The patient has no previous cardiac 
history. 



PAST MEDICAL HISTORY:  

1. Head and neck carcinoma.

2. Mycobacterium avium complex.

3. C difficile.



PAST SURGICAL HISTORY:  Mastectomy.



SOCIAL HISTORY:  Previous history of tobacco abuse.



ALLERGIES:  PENICILLIN.



MEDICATIONS:  See nursing list.



PHYSICAL EXAMINATION:

GENERAL:  Ill-appearing gentleman, thin. 

VITAL SIGNS:  Blood pressure 125/68. 

NECK:  Showed no jugular venous distention. 

LUNGS:  Coarse breath sounds bilateral. 

HEART:  Regular rate and rhythm.  Normal S1 and S2. 

ABDOMEN:  Has a gastrostomy tube. 

EXTREMITIES:  Show no edema.



LABORATORY DATA:  Sodium 135, potassium 4.5, chloride 94, bicarb 35, BUN 29,

creatinine 0.82.  Troponin 0.017.  White blood cell count 18.6, hemoglobin 8.4,

hematocrit 25.7, platelets are 223.  EKG revealed sinus tachycardia, otherwise

normal ECG. 



IMPRESSION:  

1. Chest pain atypical suggestive of pleurisy.

2. Sepsis.

3. Hepatitis C.

4. Gastric head and neck carcinoma.

5. Chronic obstructive pulmonary disease. 



This gentleman presents with atypical chest pain.  Electrocardiogram, no 
evidence of myocardial infarction.  The patient's troponin levels have not been 
significantly

elevated.  Continue supportive care.  We will follow this patient with you 
through his hospitalization. 







Job ID:  [id number]



[facility]

## 2019-11-04 LAB
ANION GAP SERPL CALC-SCNC: 13 MMOL/L (ref 10–20)
ANION GAP SERPL CALC-SCNC: 14 MMOL/L (ref 10–20)
BASOPHILS # BLD AUTO: 0 THOU/UL (ref 0–0.2)
BASOPHILS NFR BLD AUTO: 0 % (ref 0–1)
BUN SERPL-MCNC: 27 MG/DL (ref 8.4–25.7)
BUN SERPL-MCNC: 28 MG/DL (ref 8.4–25.7)
CALCIUM SERPL-MCNC: 9.1 MG/DL (ref 7.8–10.44)
CALCIUM SERPL-MCNC: 9.3 MG/DL (ref 7.8–10.44)
CHLORIDE SERPL-SCNC: 92 MMOL/L (ref 98–107)
CHLORIDE SERPL-SCNC: 95 MMOL/L (ref 98–107)
CO2 SERPL-SCNC: 32 MMOL/L (ref 23–31)
CO2 SERPL-SCNC: 34 MMOL/L (ref 23–31)
CREAT CL PREDICTED SERPL C-G-VRATE: 65 ML/MIN (ref 70–130)
CREAT CL PREDICTED SERPL C-G-VRATE: 68 ML/MIN (ref 70–130)
EOSINOPHIL # BLD AUTO: 0 THOU/UL (ref 0–0.7)
EOSINOPHIL NFR BLD AUTO: 0.1 % (ref 0–10)
GLUCOSE SERPL-MCNC: 127 MG/DL (ref 80–115)
GLUCOSE SERPL-MCNC: 132 MG/DL (ref 80–115)
HGB BLD-MCNC: 8 G/DL (ref 14–18)
LYMPHOCYTES # BLD: 0.4 THOU/UL (ref 1.2–3.4)
LYMPHOCYTES NFR BLD AUTO: 2.5 % (ref 21–51)
MCH RBC QN AUTO: 30.2 PG (ref 27–31)
MCV RBC AUTO: 95.9 FL (ref 78–98)
MONOCYTES # BLD AUTO: 0.5 THOU/UL (ref 0.11–0.59)
MONOCYTES NFR BLD AUTO: 2.8 % (ref 0–10)
NEUTROPHILS # BLD AUTO: 15.8 THOU/UL (ref 1.4–6.5)
NEUTROPHILS NFR BLD AUTO: 94.7 % (ref 42–75)
PLATELET # BLD AUTO: 247 THOU/UL (ref 130–400)
POTASSIUM SERPL-SCNC: 5.3 MMOL/L (ref 3.5–5.1)
POTASSIUM SERPL-SCNC: 5.6 MMOL/L (ref 3.5–5.1)
RBC # BLD AUTO: 2.64 MILL/UL (ref 4.7–6.1)
SODIUM SERPL-SCNC: 133 MMOL/L (ref 136–145)
SODIUM SERPL-SCNC: 136 MMOL/L (ref 136–145)
VANCOMYCIN TROUGH SERPL-MCNC: 16.4 UG/ML
WBC # BLD AUTO: 16.6 THOU/UL (ref 4.8–10.8)

## 2019-11-04 RX ADMIN — GUAIFENESIN SCH MG: 200 SOLUTION ORAL at 20:58

## 2019-11-04 RX ADMIN — ACETAMINOPHEN AND CODEINE PHOSPHATE PRN TAB: 300; 30 TABLET ORAL at 01:15

## 2019-11-04 RX ADMIN — GUAIFENESIN SCH MG: 200 SOLUTION ORAL at 09:02

## 2019-11-04 RX ADMIN — VANCOMYCIN HYDROCHLORIDE SCH MG: KIT at 17:17

## 2019-11-04 RX ADMIN — HEPARIN SODIUM SCH UNITS: 5000 INJECTION, SOLUTION INTRAVENOUS; SUBCUTANEOUS at 20:59

## 2019-11-04 RX ADMIN — ACETAMINOPHEN AND CODEINE PHOSPHATE PRN TAB: 300; 30 TABLET ORAL at 06:06

## 2019-11-04 RX ADMIN — Medication SCH EACH: at 21:02

## 2019-11-04 RX ADMIN — FAMOTIDINE SCH MG: 10 INJECTION, SOLUTION INTRAVENOUS at 08:59

## 2019-11-04 RX ADMIN — ACETAMINOPHEN AND CODEINE PHOSPHATE PRN TAB: 300; 30 TABLET ORAL at 11:12

## 2019-11-04 RX ADMIN — HEPARIN SODIUM SCH UNITS: 5000 INJECTION, SOLUTION INTRAVENOUS; SUBCUTANEOUS at 09:00

## 2019-11-04 RX ADMIN — VANCOMYCIN HYDROCHLORIDE SCH MG: KIT at 07:33

## 2019-11-04 RX ADMIN — ACETAMINOPHEN AND CODEINE PHOSPHATE PRN TAB: 300; 30 TABLET ORAL at 15:36

## 2019-11-04 RX ADMIN — VANCOMYCIN HYDROCHLORIDE SCH MG: KIT at 01:16

## 2019-11-04 RX ADMIN — VANCOMYCIN HYDROCHLORIDE SCH MG: KIT at 11:12

## 2019-11-04 RX ADMIN — ACETAMINOPHEN AND CODEINE PHOSPHATE PRN TAB: 300; 30 TABLET ORAL at 20:59

## 2019-11-04 RX ADMIN — GUAIFENESIN SCH MG: 200 SOLUTION ORAL at 15:36

## 2019-11-04 NOTE — PDOC.HOSPP
- Subjective


Encounter Date: 11/04/19


Encounter Time: 16:30


Subjective: 





Patient seen and examined for Sepsis. Pleuritic CP. No CP or SOB. No new 

complaints. No overnight events





- Objective


Vital Signs & Weight: 


 Vital Signs (12 hours)











  Temp Pulse Resp BP Pulse Ox


 


 11/04/19 15:27  97.6 F   16  139/57 L  96


 


 11/04/19 14:32   80  16  


 


 11/04/19 11:05  97.5 F L  82  16  128/77  100


 


 11/04/19 10:57   79  16  








 Weight











Admit Weight                   120 lb


 


Weight                         120 lb











 Most Recent Monitor Data











Heart Rate from ECG            107


 


NIBP                           158/98


 


NIBP BP-Mean                   118


 


Respiration from ECG           7


 


SpO2                           99














I&O: 


 











 11/03/19 11/04/19 11/05/19





 06:59 06:59 06:59


 


Intake Total  3630 1780


 


Output Total  3450 1350


 


Balance  180 430











Result Diagrams: 


 11/04/19 05:05





 11/04/19 14:00


EKG Reviewed by me: Yes (Tele SR)





Hospitalist ROS





- Review of Systems


Respiratory: denies: cough, dry, shortness of breath, hemoptysis, SOB with 

excertion, pleuritic pain, sputum, wheezing, other


Cardiovascular: reports: chest pain.  denies: palpitations, orthopnea, 

paroxysmal noc. dyspnea, edema, light headedness, other





- Medication


Medications: 


Active Medications











Generic Name Dose Route Start Last Admin





  Trade Name Freq  PRN Reason Stop Dose Admin


 


Acetaminophen/Codeine Phosphate  1 tab  10/26/19 00:00  10/30/19 17:29





  Tylenol #3  PO   1 tab





  Q4H PRN   Administration





  Moderate Pain (4-6)   





     





     





     


 


Acetaminophen/Codeine Phosphate  2 tab  10/26/19 00:00  11/04/19 20:59





  Tylenol #3  PO   2 tab





  Q4H PRN   Administration





  Severe Pain (7-10)   





     





     





     


 


Albuterol/Ipratropium  3 ml  10/27/19 14:30  11/04/19 19:00





  Duoneb  NEB   3 ml





  [id number] SHAREE   Administration





     





     





     





     


 


Docusate Sodium  100 mg  10/30/19 09:00  11/04/19 21:00





  Colace  PO   Not Given





  BID SHAREE   





     





     





     





     


 


Guaifenesin  600 mg  11/04/19 09:00  11/04/19 20:58





  Robitussin Sf  PER TUBE   600 mg





  TID SHAREE   Administration





     





     





     





     


 


Heparin Sodium (Porcine)  5,000 units  11/03/19 21:00  11/04/19 20:59





  Heparin  SC   5,000 units





  BID SHAREE   Administration





     





     





     





     


 


Vancomycin HCl 1 gm/ Device  200 mls @ 200 mls/hr  10/27/19 20:00  11/03/19 19:

51





  IVPB   200 mls





  2000 SHAREE   Administration





     





     





     





     


 


Lidocaine  1 patch  10/26/19 09:00  11/04/19 09:00





  Lidoderm 5% Patch  TD   1 patch





  DAILY SHAREE   Administration





     





     





     





     


 


Methylprednisolone Sodium Succinate  20 mg  10/30/19 21:00  11/04/19 21:00





  Solu-Medrol  IVP   20 mg





  Q12HR SHAREE   Administration





     





     





     





     


 


Midodrine  5 mg  11/02/19 21:00  11/04/19 20:58





  Proamatine  PO   5 mg





  TID SHAREE   Administration





     





     





     





     


 


Miscellaneous Medication  1 each  10/26/19 21:00  11/04/19 21:02





  Lidocaine Patch Removal  TOP   1 each





  2100 SHAREE   Administration





     





     





     





     


 


Neomycin/Polymyxin/Bacitracin  1 gm  11/03/19 00:14  11/03/19 00:37





  Triple Antibiotic  TOP   1 gm





  Q4H PRN   Administration





  REDNESS AND IRRITATION   





     





     





     


 


Ondansetron HCl  4 mg  10/25/19 23:45  11/02/19 19:34





  Zofran  IVP   4 mg





  Q6H PRN   Administration





  Nausea/Vomiting   





     





     





     


 


Polyethylene Glycol  17 gm  10/30/19 09:00  11/04/19 09:00





  Miralax  PO   Not Given





  DAILY SHAREE   





     





     





     





     


 


Tamsulosin HCl  0.4 mg  11/02/19 21:00  11/04/19 20:58





  Flomax  PO   0.4 mg





  HS SHAREE   Administration





     





     





     





     


 


Vancomycin HCl  125 mg  10/26/19 12:00  11/04/19 17:17





  First Vancomycin  PER TUBE   125 mg





  Q6HR SHAREE   Administration





     





     





     





     














- Exam


General Appearance: NAD


Neck: supple, no JVD


Heart: RRR, no gallops, no rubs


Respiratory: CTAB, no rales, no ronchi


Gastrointestinal: soft, non-tender, non-distended, normal bowel sounds


Extremities: no edema





Hosp A/P





- Plan


PT/OT, DVT proph w/heparin





Severe Sepsis


C diff colitis (POA)


MRSA bacteremia


Pleuritic CP


Hyperkalemia


Throat CA


Swallow dysfunction


Urinary retention


Orthostatic hypotension


Acute hypoxic resp failure and ZOLTAN on admission


Hyponatremia


Mod PEM





PLAN:


DC Potassium chloride


Cont Midodrine


Cont PO Vancomycin


Await Cardio input


AM labs


DC planning in 24 hr if stable

## 2019-11-04 NOTE — CON
DATE OF CONSULTATION:  11/02/2019



REQUESTING PHYSICIAN:  Kendall Bernardo MD



REASON FOR CONSULTATION:  Urinary retention.



HISTORY OF PRESENT ILLNESS:  Mr. Castro is a 69-year-old male with history of head

and neck cancer, status post resection of part of his mandible as well as chronic

gastrostomy tube, severe hearing impairment, hypertension, chronic hepatitis C,

history of C diff, as well as mycobacterium avium complex.  The patient is an inmate

at [facility].  He came in secondary to hypotension and hypoxemia.  The patient's initial

blood pressure was 84/50.  He had one blood culture positive for MRSA.  The patient

denied any voiding difficulty prior to this, however, last night he was having

difficulty urinating.  His lower abdomen became distended.  A bladder scan was

performed, which demonstrated greater than 1 L in his bladder.  A Armstrong catheter was

successfully inserted with drainage of over a liter of urine.  The patient now

denies any pain.  He does report that at the age of 24, he had some type of

procedure on his urethra, although he is unclear exactly what.  He denies any

prostate issues.  He does not take any medications for BPH.  He has not seen a

urologist since his mid 20s.  He reports constipation over the past 1 to 2 weeks,

this has been intermittent.  He has no other complaints. 



REVIEW OF SYSTEMS:  Full 12-point review of systems was performed and is negative

other than that mentioned in HPI. 



PAST MEDICAL HISTORY:  Head and neck cancer, nutritional deficiency with chronic

G-tube in place, hypertension, C diff, hepatitis C, COPD, mycobacterium avium

complex, and chronic kidney disease. 



PAST SURGICAL HISTORY:  Mastoidectomy.



FAMILY HISTORY:  Noncontributory.



SOCIAL HISTORY:  He is an inmate at [facility].  Positive extensive prior smoking history.



ALLERGIES:  PENICILLIN CAUSES A RASH.



CURRENT MEDICATIONS:  

1. IV vancomycin.

2. Potassium.

3. MiraLAX.

4. Zofran.

5. Solu-Medrol.

6. Mucinex.

7. Colace.

8. DuoNeb.

9. Tylenol No.3.



PHYSICAL EXAMINATION:

VITAL SIGNS:  T-max 99.8, currently afebrile; blood pressure 115/63; pulse 87;

respirations 16; and oxygen saturation 96% on 5 L nasal cannula. 

GENERAL:  Alert and oriented x3, in no apparent distress. 

HEENT:  Normocephalic, atraumatic. 

NECK:  Supple.  No masses or lymphadenopathy. 

CARDIOVASCULAR:  Regular rate and rhythm. 

PULMONARY:  Breathing unlabored. 

ABDOMEN:  Soft, nontender/nondistended.  No masses or organomegaly.  No suprapubic

tenderness to palpation.  No CVA tenderness. 

GENITOURINARY:  Normal penis without concerning lesions.  Scrotum normal.  Testes

palpably normal bilaterally.  Armstrong catheter in place, draining clear yellow urine. 

EXTREMITIES:  Warm and well perfused.  No edema. 

NEUROLOGIC:  No focal deficits.



LABORATORY DATA:  White blood cell count 9.9, hemoglobin 9.4, and platelets 216.

Sodium is 139; creatinine 1.74 at admission, now down to 0.95. 



ASSESSMENT:  A 69-year-old male with urinary retention and multiple acute chronic

medical problems as listed above. 



PLAN:  I reviewed urinary tension with the patient in detail.  Etiology is unclear.

Likely multifactorial.  The patient has had significant constipation, which may have

contributed to this.  He denies any voiding difficulty prior.  Recommend starting

tamsulosin if his blood pressure allows this.  Once on tamsulosin for minimum of 48

hours, a repeat 

voiding trial can be performed.  If the patient is ready for discharge prior to

this, he can be discharged home with his Armstrong catheter and he can follow up with

Urology as an outpatient for voiding trial.  He should continue Flomax again if his

blood pressure can tolerate this. 







Job ID:  [id number]

## 2019-11-05 VITALS — TEMPERATURE: 98.3 F | DIASTOLIC BLOOD PRESSURE: 73 MMHG | SYSTOLIC BLOOD PRESSURE: 128 MMHG

## 2019-11-05 LAB
ALBUMIN SERPL BCG-MCNC: 3.7 G/DL (ref 3.4–4.8)
ALP SERPL-CCNC: 72 U/L (ref 40–110)
ALT SERPL W P-5'-P-CCNC: 17 U/L (ref 8–55)
ANION GAP SERPL CALC-SCNC: 13 MMOL/L (ref 10–20)
AST SERPL-CCNC: 16 U/L (ref 5–34)
BASOPHILS # BLD AUTO: 0 THOU/UL (ref 0–0.2)
BASOPHILS NFR BLD AUTO: 0.1 % (ref 0–1)
BILIRUB SERPL-MCNC: 0.4 MG/DL (ref 0.2–1.2)
BUN SERPL-MCNC: 29 MG/DL (ref 8.4–25.7)
CALCIUM SERPL-MCNC: 9.2 MG/DL (ref 7.8–10.44)
CHLORIDE SERPL-SCNC: 91 MMOL/L (ref 98–107)
CO2 SERPL-SCNC: 34 MMOL/L (ref 23–31)
CREAT CL PREDICTED SERPL C-G-VRATE: 66 ML/MIN (ref 70–130)
EOSINOPHIL # BLD AUTO: 0 THOU/UL (ref 0–0.7)
EOSINOPHIL NFR BLD AUTO: 0 % (ref 0–10)
GLOBULIN SER CALC-MCNC: 2.4 G/DL (ref 2.4–3.5)
GLUCOSE SERPL-MCNC: 121 MG/DL (ref 80–115)
HGB BLD-MCNC: 8.4 G/DL (ref 14–18)
LYMPHOCYTES # BLD: 0.1 THOU/UL (ref 1.2–3.4)
LYMPHOCYTES NFR BLD AUTO: 0.9 % (ref 21–51)
MAGNESIUM SERPL-MCNC: 2.1 MG/DL (ref 1.6–2.6)
MCH RBC QN AUTO: 30.9 PG (ref 27–31)
MCV RBC AUTO: 94.6 FL (ref 78–98)
MONOCYTES # BLD AUTO: 0.2 THOU/UL (ref 0.11–0.59)
MONOCYTES NFR BLD AUTO: 1.5 % (ref 0–10)
NEUTROPHILS # BLD AUTO: 14.3 THOU/UL (ref 1.4–6.5)
NEUTROPHILS NFR BLD AUTO: 97.5 % (ref 42–75)
PLATELET # BLD AUTO: 259 THOU/UL (ref 130–400)
POTASSIUM SERPL-SCNC: 5 MMOL/L (ref 3.5–5.1)
RBC # BLD AUTO: 2.71 MILL/UL (ref 4.7–6.1)
SODIUM SERPL-SCNC: 133 MMOL/L (ref 136–145)
WBC # BLD AUTO: 14.7 THOU/UL (ref 4.8–10.8)

## 2019-11-05 RX ADMIN — VANCOMYCIN HYDROCHLORIDE SCH: 1 INJECTION, SOLUTION INTRAVENOUS at 00:36

## 2019-11-05 RX ADMIN — HEPARIN SODIUM SCH UNITS: 5000 INJECTION, SOLUTION INTRAVENOUS; SUBCUTANEOUS at 10:06

## 2019-11-05 RX ADMIN — ACETAMINOPHEN AND CODEINE PHOSPHATE PRN TAB: 300; 30 TABLET ORAL at 10:23

## 2019-11-05 RX ADMIN — VANCOMYCIN HYDROCHLORIDE SCH MG: KIT at 18:23

## 2019-11-05 RX ADMIN — GUAIFENESIN SCH MG: 200 SOLUTION ORAL at 10:06

## 2019-11-05 RX ADMIN — VANCOMYCIN HYDROCHLORIDE SCH MG: KIT at 12:05

## 2019-11-05 RX ADMIN — VANCOMYCIN HYDROCHLORIDE SCH MG: KIT at 06:21

## 2019-11-05 RX ADMIN — GUAIFENESIN SCH MG: 200 SOLUTION ORAL at 15:30

## 2019-11-05 RX ADMIN — ACETAMINOPHEN AND CODEINE PHOSPHATE PRN TAB: 300; 30 TABLET ORAL at 20:18

## 2019-11-05 RX ADMIN — GUAIFENESIN SCH MG: 200 SOLUTION ORAL at 20:17

## 2019-11-05 RX ADMIN — ACETAMINOPHEN AND CODEINE PHOSPHATE PRN TAB: 300; 30 TABLET ORAL at 03:52

## 2019-11-05 RX ADMIN — Medication SCH EACH: at 20:18

## 2019-11-05 RX ADMIN — ACETAMINOPHEN AND CODEINE PHOSPHATE PRN TAB: 300; 30 TABLET ORAL at 15:31

## 2019-11-05 RX ADMIN — HEPARIN SODIUM SCH UNITS: 5000 INJECTION, SOLUTION INTRAVENOUS; SUBCUTANEOUS at 20:18

## 2019-11-05 RX ADMIN — VANCOMYCIN HYDROCHLORIDE SCH MG: KIT at 01:33

## 2019-11-05 NOTE — PDOC.HOSPP
- Subjective


Encounter Date: 11/05/19


Encounter Time: 16:30


Subjective: 





Patient seen and examined for Bacteremia/C diff. No new CP or fever. Feeling 

better. No new complaints. No overnight events





- Objective


Vital Signs & Weight: 


 Vital Signs (12 hours)











  Temp Pulse Resp BP BP Pulse Ox


 


 11/05/19 20:00  98.3 F  90  16  128/73   96


 


 11/05/19 19:11   71  16    95


 


 11/05/19 15:45     91/66  


 


 11/05/19 15:31  98.6 F  86  17  88/51 L   95


 


 11/05/19 15:17   76  20    96


 


 11/05/19 11:59  97.5 F L  85  16   127/74  96


 


 11/05/19 11:20   83  16   








 Weight











Admit Weight                   120 lb


 


Weight                         121 lb 11.2 oz











 Most Recent Monitor Data











Heart Rate from ECG            107


 


NIBP                           158/98


 


NIBP BP-Mean                   118


 


Respiration from ECG           7


 


SpO2                           99














I&O: 


 











 11/04/19 11/05/19 11/06/19





 06:59 06:59 06:59


 


Intake Total 3630 2560 1580


 


Output Total 3450 3050 3175


 


Balance 744 -563 -0788











Result Diagrams: 


 11/05/19 05:25





 11/05/19 05:25


EKG Reviewed by me: Yes (Tele SR)





Hospitalist ROS





- Review of Systems


Respiratory: denies: cough, dry, shortness of breath, hemoptysis, SOB with 

excertion, pleuritic pain, sputum, wheezing, other


Cardiovascular: denies: chest pain, palpitations, orthopnea, paroxysmal noc. 

dyspnea, edema, light headedness, other





- Medication


Medications: 


Active Medications











Generic Name Dose Route Start Last Admin





  Trade Name Freq  PRN Reason Stop Dose Admin


 


Acetaminophen/Codeine Phosphate  2 tab  11/05/19 03:31  11/05/19 20:18





  Tylenol #3  PO   2 tab





  Q4H PRN   Administration





  Pain   





     





     





     


 


Albuterol/Ipratropium  3 ml  10/27/19 14:30  11/05/19 19:11





  Duoneb  NEB   3 ml





  [id number] SHAREE   Administration





     





     





     





     


 


Docusate Sodium  100 mg  10/30/19 09:00  11/05/19 21:07





  Colace  PO   Not Given





  BID SHAREE   





     





     





     





     


 


Famotidine  20 mg  11/05/19 09:00  11/05/19 11:02





  Pepcid  PER TUBE   20 mg





  DAILY SHAREE   Administration





     





     





     





     


 


Guaifenesin  600 mg  11/04/19 09:00  11/05/19 20:17





  Robitussin Sf  PER TUBE   600 mg





  TID SHAREE   Administration





     





     





     





     


 


Heparin Sodium (Porcine)  5,000 units  11/03/19 21:00  11/05/19 20:18





  Heparin  SC   5,000 units





  BID SHAREE   Administration





     





     





     





     


 


Vancomycin HCl 1 gm/ Device  200 mls @ 200 mls/hr  11/04/19 23:59  11/05/19 01:

33





  IVPB   200 mls





  2359 SHAREE   Administration





     





     





     





     


 


Lidocaine  1 patch  10/26/19 09:00  11/05/19 10:09





  Lidoderm 5% Patch  TD   1 patch





  DAILY SHAREE   Administration





     





     





     





     


 


Methylprednisolone Sodium Succinate  20 mg  10/30/19 21:00  11/05/19 20:20





  Solu-Medrol  IVP   20 mg





  Q12HR SHAREE   Administration





     





     





     





     


 


Midodrine  5 mg  11/02/19 21:00  11/05/19 20:17





  Proamatine  PO   5 mg





  TID SHAREE   Administration





     





     





     





     


 


Miscellaneous Medication  1 each  10/26/19 21:00  11/05/19 20:18





  Lidocaine Patch Removal  TOP   1 each





  2100 SHAREE   Administration





     





     





     





     


 


Neomycin/Polymyxin/Bacitracin  1 gm  11/03/19 00:14  11/03/19 00:37





  Triple Antibiotic  TOP   1 gm





  Q4H PRN   Administration





  REDNESS AND IRRITATION   





     





     





     


 


Ondansetron HCl  4 mg  10/25/19 23:45  11/02/19 19:34





  Zofran  IVP   4 mg





  Q6H PRN   Administration





  Nausea/Vomiting   





     





     





     


 


Polyethylene Glycol  17 gm  10/30/19 09:00  11/05/19 11:17





  Miralax  PO   Not Given





  DAILY [facility]   





     





     





     





     


 


Saccharomyces Boulardii  250 mg  11/05/19 09:00  11/05/19 10:06





  Florastor  PO   250 mg





  DAILY SHAREE   Administration





     





     





     





     


 


Tamsulosin HCl  0.4 mg  11/02/19 21:00  11/05/19 20:17





  Flomax  PO   0.4 mg





  HS SHAREE   Administration





     





     





     





     


 


Vancomycin HCl  125 mg  10/26/19 12:00  11/05/19 18:23





  First Vancomycin  PER TUBE   125 mg





  Q6HR SHAREE   Administration





     





     





     





     














- Exam


General Appearance: NAD


Neck: supple, no JVD


Heart: RRR, no gallops


Respiratory: CTAB, no rales


Gastrointestinal: soft, non-tender, normal bowel sounds


Extremities: no edema





Hosp A/P





- Plan


DVT proph w/SCDs





Severe Sepsis


C diff colitis (POA) - on PO Vancomycin


MRSA bacteremia - on IV Vancomycin


Pleuritic CP - noncardiac per Cardiology


Hyperkalemia


Throat CA


Swallow dysfunction - on PEG tube feed


Urinary retention - Armstrong placed


Orthostatic hypotension - on Midodrine


Acute hypoxic resp failure and ZOLTAN on admission


Hyponatremia


Mod PEM





PLAN:


Cont Midodrine


Cont PO Vancomycin


Cont Armstrong


Outpt Urology eval


Not on Flomax due to BP on lower side


Cont PEG tube feeds


DC to [facility] today - Patient is cleared by Cardiology for dc

## 2019-11-06 NOTE — DIS
DATE OF ADMISSION:  10/25/2019



DATE OF DISCHARGE:  11/05/2019



DISCHARGE DISPOSITION:  [facility]. 



The patient was seen and examined on the day of discharge.  Denies any new

complaints. 



DISCHARGE MEDICATIONS:  

1. IV vancomycin as directed to be discontinued on 25th November 2019.  The patient

will require weekly CBC, CRP, CMP, and vancomycin trough twice a week. 

2. Oral vancomycin for C diff colitis to be discontinued on 10th November 2019.

3. Prednisone taper.

4. Midodrine 5 mg three times a day.

5. MiraLAX daily.

6. Florastor 250 mg daily.

7. Flomax 0.4 mg at bedtime.

8. Tylenol as needed.

9. Colace 100 mg b.i.d. 

The patient will continue Armstrong catheter.  Urology evaluation as outpatient is

recommended. 



Continue PEG tube feedings. 



Please discontinue PICC line after completion of IV antibiotics.



ALLERGIES:  THE PATIENT IS ALLERGIC TO PENICILLIN.



INPATIENT CONSULTANTS:  

1. Cardiology, Dr. Shai Shelton.

2. Urology, Dr. Ortiz.

3. Infectious Disease, Dr. Sherwood.

4. Critical Care, Dr. Castro.



DIAGNOSTIC TESTS:  Chest x-ray on 25th October 2019, showed bibasilar opacities,

right greater than left. 



CT angiogram of the chest was negative for pulmonary embolism.  It showed

mediastinal and right hilar lymphadenopathy. 



Echocardiogram showed left ventricular ejection fraction of 55% to 60% without any

vegetation. 



Blood culture, 1/2 was positive for MRSA. 



Sputum AFB negative. 



Repeat blood cultures have been negative. 



Please follow up on the final AFB culture report.



BRIEF HOSPITAL COURSE:  The patient is a 69-year-old male, who presented to the

emergency room on 25th October 2019, with shortness of breath and hypoxia.  He was

diagnosed with C diff prior to admission.  His blood pressure in the emergency room

was 84/50.  Please refer to the history and physical for further details. 



The patient was admitted to the hospital with a diagnosis of sepsis.  He was started

on broad-spectrum antibiotics.  His workup was consistent with MRSA bacteremia.  He

will continue vancomycin via PICC line until 25th November.  He was also started on

stress dose steroids, which will be gradually weaned off.  He was seen by several

consultants including Critical Care, Infectious Disease, and Urology for urinary

retention.  He is stable to be transitioned to [facility]. 



FINAL DIAGNOSES:  

1. Severe sepsis with Methicillin-resistant Staphylococcus aureus bacteremia, source

unknown. 

2. Clostridium difficile colitis, present on admission.

3. Pleuritic chest pain.  The patient was evaluated by Cardiology.  The pain is

noncardiac. 

4. Hyperkalemia, resolved.

5. History of throat cancer.

6. Swallow dysfunction.  The patient is currently on PEG tube feeding.

7. Urinary retention.  The patient will continue Armstrong catheter.  He would need a

Urology followup as outpatient. 

8. Orthostatic hypotension.  The patient is on midodrine.

9. Acute hypoxic respiratory failure on admission, resolved.

10. Acute kidney injury with creatinine 1.74 on admission, resolved.

11. Hyponatremia.

12. Moderate protein-calorie malnutrition.

13. Type 2 myocardial infarction.

14. Abnormal TSH of 0.0153 with free T4 of 1.52.  Primary care physician advised to

follow. 

15. Hypokalemia, corrected.

16. Hypernatremia, on admission.

17. Lactic acidosis, resolved.

18. Penicillin allergy.



TIME SPENT WITH PATIENT:  Total time coordinating the discharge of this patient was

36 minutes. 







Job ID:  [id number]